# Patient Record
Sex: FEMALE | Race: WHITE | NOT HISPANIC OR LATINO | Employment: FULL TIME | URBAN - METROPOLITAN AREA
[De-identification: names, ages, dates, MRNs, and addresses within clinical notes are randomized per-mention and may not be internally consistent; named-entity substitution may affect disease eponyms.]

---

## 2021-07-12 ENCOUNTER — TRANSFERRED RECORDS (OUTPATIENT)
Dept: MULTI SPECIALTY CLINIC | Facility: CLINIC | Age: 36
End: 2021-07-12

## 2021-07-12 LAB
HPV ABSTRACT: NORMAL
PAP-ABSTRACT: NORMAL

## 2023-04-14 ENCOUNTER — OFFICE VISIT (OUTPATIENT)
Dept: FAMILY MEDICINE | Facility: CLINIC | Age: 38
End: 2023-04-14
Payer: COMMERCIAL

## 2023-04-14 VITALS
RESPIRATION RATE: 20 BRPM | WEIGHT: 233 LBS | HEART RATE: 68 BPM | TEMPERATURE: 99.2 F | BODY MASS INDEX: 37.45 KG/M2 | SYSTOLIC BLOOD PRESSURE: 118 MMHG | OXYGEN SATURATION: 99 % | HEIGHT: 66 IN | DIASTOLIC BLOOD PRESSURE: 76 MMHG

## 2023-04-14 DIAGNOSIS — Z00.00 ROUTINE GENERAL MEDICAL EXAMINATION AT A HEALTH CARE FACILITY: Primary | ICD-10-CM

## 2023-04-14 DIAGNOSIS — Z13.220 LIPID SCREENING: ICD-10-CM

## 2023-04-14 DIAGNOSIS — Z11.4 SCREENING FOR HIV (HUMAN IMMUNODEFICIENCY VIRUS): ICD-10-CM

## 2023-04-14 DIAGNOSIS — Z11.59 SCREENING FOR VIRAL DISEASE: ICD-10-CM

## 2023-04-14 DIAGNOSIS — K59.00 CONSTIPATION, UNSPECIFIED CONSTIPATION TYPE: ICD-10-CM

## 2023-04-14 DIAGNOSIS — Z11.59 NEED FOR HEPATITIS C SCREENING TEST: ICD-10-CM

## 2023-04-14 DIAGNOSIS — Z12.4 CERVICAL CANCER SCREENING: ICD-10-CM

## 2023-04-14 DIAGNOSIS — F33.1 MODERATE EPISODE OF RECURRENT MAJOR DEPRESSIVE DISORDER (H): ICD-10-CM

## 2023-04-14 DIAGNOSIS — R73.03 PREDIABETES: ICD-10-CM

## 2023-04-14 DIAGNOSIS — F41.1 GENERALIZED ANXIETY DISORDER: ICD-10-CM

## 2023-04-14 PROBLEM — F33.9 MAJOR DEPRESSION, RECURRENT (H): Status: ACTIVE | Noted: 2023-04-14

## 2023-04-14 PROBLEM — F60.3 BORDERLINE PERSONALITY DISORDER (H): Status: RESOLVED | Noted: 2023-04-14 | Resolved: 2023-04-14

## 2023-04-14 PROBLEM — R73.09 ELEVATED HEMOGLOBIN A1C: Status: RESOLVED | Noted: 2021-07-13 | Resolved: 2023-04-14

## 2023-04-14 PROBLEM — F43.10 PTSD (POST-TRAUMATIC STRESS DISORDER): Status: ACTIVE | Noted: 2017-02-03

## 2023-04-14 PROBLEM — F60.3 BORDERLINE PERSONALITY DISORDER (H): Status: ACTIVE | Noted: 2023-04-14

## 2023-04-14 PROBLEM — R73.09 ELEVATED HEMOGLOBIN A1C: Status: ACTIVE | Noted: 2021-07-13

## 2023-04-14 LAB
ANION GAP SERPL CALCULATED.3IONS-SCNC: 15 MMOL/L (ref 7–15)
BUN SERPL-MCNC: 9.6 MG/DL (ref 6–20)
CALCIUM SERPL-MCNC: 10.3 MG/DL (ref 8.6–10)
CHLORIDE SERPL-SCNC: 104 MMOL/L (ref 98–107)
CHOLEST SERPL-MCNC: 124 MG/DL
CREAT SERPL-MCNC: 0.72 MG/DL (ref 0.51–0.95)
DEPRECATED HCO3 PLAS-SCNC: 22 MMOL/L (ref 22–29)
GFR SERPL CREATININE-BSD FRML MDRD: >90 ML/MIN/1.73M2
GLUCOSE SERPL-MCNC: 111 MG/DL (ref 70–99)
HBA1C MFR BLD: 6.1 % (ref 0–5.6)
HDLC SERPL-MCNC: 57 MG/DL
LDLC SERPL CALC-MCNC: 49 MG/DL
NONHDLC SERPL-MCNC: 67 MG/DL
POTASSIUM SERPL-SCNC: 4.4 MMOL/L (ref 3.4–5.3)
SODIUM SERPL-SCNC: 141 MMOL/L (ref 136–145)
TRIGL SERPL-MCNC: 92 MG/DL
TSH SERPL DL<=0.005 MIU/L-ACNC: 1.75 UIU/ML (ref 0.3–4.2)

## 2023-04-14 PROCEDURE — 91312 COVID-19 VACCINE BIVALENT BOOSTER 12+ (PFIZER): CPT | Performed by: FAMILY MEDICINE

## 2023-04-14 PROCEDURE — 87389 HIV-1 AG W/HIV-1&-2 AB AG IA: CPT | Performed by: FAMILY MEDICINE

## 2023-04-14 PROCEDURE — 80048 BASIC METABOLIC PNL TOTAL CA: CPT | Performed by: FAMILY MEDICINE

## 2023-04-14 PROCEDURE — 83036 HEMOGLOBIN GLYCOSYLATED A1C: CPT | Performed by: FAMILY MEDICINE

## 2023-04-14 PROCEDURE — 84443 ASSAY THYROID STIM HORMONE: CPT | Performed by: FAMILY MEDICINE

## 2023-04-14 PROCEDURE — 86803 HEPATITIS C AB TEST: CPT | Performed by: FAMILY MEDICINE

## 2023-04-14 PROCEDURE — 86706 HEP B SURFACE ANTIBODY: CPT | Performed by: FAMILY MEDICINE

## 2023-04-14 PROCEDURE — 36415 COLL VENOUS BLD VENIPUNCTURE: CPT | Performed by: FAMILY MEDICINE

## 2023-04-14 PROCEDURE — 0124A COVID-19 VACCINE BIVALENT BOOSTER 12+ (PFIZER): CPT | Performed by: FAMILY MEDICINE

## 2023-04-14 PROCEDURE — 99385 PREV VISIT NEW AGE 18-39: CPT | Mod: 25 | Performed by: FAMILY MEDICINE

## 2023-04-14 PROCEDURE — 80061 LIPID PANEL: CPT | Performed by: FAMILY MEDICINE

## 2023-04-14 PROCEDURE — 90471 IMMUNIZATION ADMIN: CPT | Performed by: FAMILY MEDICINE

## 2023-04-14 PROCEDURE — 90715 TDAP VACCINE 7 YRS/> IM: CPT | Performed by: FAMILY MEDICINE

## 2023-04-14 PROCEDURE — 99214 OFFICE O/P EST MOD 30 MIN: CPT | Mod: 25 | Performed by: FAMILY MEDICINE

## 2023-04-14 RX ORDER — POLYETHYLENE GLYCOL 3350 17 G/17G
1 POWDER, FOR SOLUTION ORAL DAILY
Qty: 116 G | Refills: 1 | Status: SHIPPED | OUTPATIENT
Start: 2023-04-14 | End: 2023-09-14

## 2023-04-14 RX ORDER — BUSPIRONE HYDROCHLORIDE 5 MG/1
TABLET ORAL
Qty: 264 TABLET | Refills: 0 | Status: SHIPPED | OUTPATIENT
Start: 2023-04-14 | End: 2023-07-18

## 2023-04-14 ASSESSMENT — ANXIETY QUESTIONNAIRES
8. IF YOU CHECKED OFF ANY PROBLEMS, HOW DIFFICULT HAVE THESE MADE IT FOR YOU TO DO YOUR WORK, TAKE CARE OF THINGS AT HOME, OR GET ALONG WITH OTHER PEOPLE?: SOMEWHAT DIFFICULT
5. BEING SO RESTLESS THAT IT IS HARD TO SIT STILL: SEVERAL DAYS
3. WORRYING TOO MUCH ABOUT DIFFERENT THINGS: MORE THAN HALF THE DAYS
GAD7 TOTAL SCORE: 10
2. NOT BEING ABLE TO STOP OR CONTROL WORRYING: MORE THAN HALF THE DAYS
7. FEELING AFRAID AS IF SOMETHING AWFUL MIGHT HAPPEN: SEVERAL DAYS
4. TROUBLE RELAXING: SEVERAL DAYS
GAD7 TOTAL SCORE: 10
7. FEELING AFRAID AS IF SOMETHING AWFUL MIGHT HAPPEN: SEVERAL DAYS
6. BECOMING EASILY ANNOYED OR IRRITABLE: SEVERAL DAYS
GAD7 TOTAL SCORE: 10
IF YOU CHECKED OFF ANY PROBLEMS ON THIS QUESTIONNAIRE, HOW DIFFICULT HAVE THESE PROBLEMS MADE IT FOR YOU TO DO YOUR WORK, TAKE CARE OF THINGS AT HOME, OR GET ALONG WITH OTHER PEOPLE: SOMEWHAT DIFFICULT
1. FEELING NERVOUS, ANXIOUS, OR ON EDGE: MORE THAN HALF THE DAYS

## 2023-04-14 ASSESSMENT — PATIENT HEALTH QUESTIONNAIRE - PHQ9
SUM OF ALL RESPONSES TO PHQ QUESTIONS 1-9: 9
SUM OF ALL RESPONSES TO PHQ QUESTIONS 1-9: 9
10. IF YOU CHECKED OFF ANY PROBLEMS, HOW DIFFICULT HAVE THESE PROBLEMS MADE IT FOR YOU TO DO YOUR WORK, TAKE CARE OF THINGS AT HOME, OR GET ALONG WITH OTHER PEOPLE: SOMEWHAT DIFFICULT

## 2023-04-14 ASSESSMENT — PAIN SCALES - GENERAL: PAINLEVEL: NO PAIN (0)

## 2023-04-14 NOTE — LETTER
April 17, 2023      Malou Gomes  [address] [facility] [address]        Dear ,    The results of your recent lab tests were within normal limits. Enclosed is a copy of these results.  If you have any further questions or problems, please contact our office.      Sincerely,      Blanquita Schwab, DO      Resulted Orders   Lipid panel reflex to direct LDL Non-fasting   Result Value Ref Range    Cholesterol 124 <200 mg/dL    Triglycerides 92 <150 mg/dL    Direct Measure HDL 57 >=50 mg/dL    LDL Cholesterol Calculated 49 <=100 mg/dL    Non HDL Cholesterol 67 <130 mg/dL    Narrative    Cholesterol  Desirable:  <200 mg/dL    Triglycerides  Normal:  Less than 150 mg/dL  Borderline High:  150-199 mg/dL  High:  200-499 mg/dL  Very High:  Greater than or equal to 500 mg/dL    Direct Measure HDL  Female:  Greater than or equal to 50 mg/dL   Male:  Greater than or equal to 40 mg/dL    LDL Cholesterol  Desirable:  <100mg/dL  Above Desirable:  100-129 mg/dL   Borderline High:  130-159 mg/dL   High:  160-189 mg/dL   Very High:  >= 190 mg/dL    Non HDL Cholesterol  Desirable:  130 mg/dL  Above Desirable:  130-159 mg/dL  Borderline High:  160-189 mg/dL  High:  190-219 mg/dL  Very High:  Greater than or equal to 220 mg/dL   TSH with free T4 reflex   Result Value Ref Range    TSH 1.75 0.30 - 4.20 uIU/mL   Hemoglobin A1c   Result Value Ref Range    Hemoglobin A1C 6.1 (H) 0.0 - 5.6 %      Comment:      Normal <5.7%   Prediabetes 5.7-6.4%    Diabetes 6.5% or higher     Note: Adopted from ADA consensus guidelines.   Basic metabolic panel  (Ca, Cl, CO2, Creat, Gluc, K, Na, BUN)   Result Value Ref Range    Sodium 141 136 - 145 mmol/L    Potassium 4.4 3.4 - 5.3 mmol/L    Chloride 104 98 - 107 mmol/L    Carbon Dioxide (CO2) 22 22 - 29 mmol/L    Anion Gap 15 7 - 15 mmol/L    Urea Nitrogen 9.6 6.0 - 20.0 mg/dL    Creatinine 0.72 0.51 - 0.95 mg/dL    Calcium 10.3 (H) 8.6 - 10.0 mg/dL    Glucose 111 (H) 70 - 99 mg/dL    GFR Estimate  >90 >60 mL/min/1.73m2      Comment:      eGFR calculated using 2021 CKD-EPI equation.

## 2023-04-14 NOTE — PROGRESS NOTES
SUBJECTIVE:   CC: Malou is an 37 year old who presents for preventive health visit.       4/14/2023     7:13 AM   Additional Questions   Roomed by Sasha   Accompanied by self         4/14/2023     7:13 AM   Patient Reported Additional Medications   Patient reports taking the following new medications Paxil 40mg, Lorazepam 1mg PRN     Patient has been advised of split billing requirements and indicates understanding: Yes     History of Present Illness       Mental Health Follow-up:  Patient presents to follow-up on Depression & Anxiety.Patient's depression since last visit has been:  No change  The patient is not having other symptoms associated with depression.  Patient's anxiety since last visit has been:  No change  The patient is having other symptoms associated with anxiety.  Any significant life events: relationship concerns and financial concerns  Patient is feeling anxious or having panic attacks.  Patient has no concerns about alcohol or drug use.    Reason for visit:  New patient. have concerns about prescriptions, prediabetes and constipation  Symptom onset:  More than a month  Symptoms include:  Prediabetes via bloodwork. lack of bowel movements  Symptom intensity:  Moderate  Symptom progression:  Staying the same  Had these symptoms before:  Yes  Has tried/received treatment for these symptoms:  No    She eats 2-3 servings of fruits and vegetables daily.She consumes 0 sweetened beverage(s) daily.She exercises with enough effort to increase her heart rate 20 to 29 minutes per day.  She exercises with enough effort to increase her heart rate 5 days per week.   She is taking medications regularly.  She is not taking prescribed medications regularly due to None.    Today's PHQ-9         PHQ-9 Total Score: 9    PHQ-9 Q9 Thoughts of better off dead/self-harm past 2 weeks :   Not at all    How difficult have these problems made it for you to do your work, take care of things at home, or get along with other  people: Somewhat difficult  Today's ADELINA-7 Score: 10  Healthy Habits:     Getting at least 3 servings of Calcium per day:  Yes    Bi-annual eye exam:  Yes    Dental care twice a year:  Yes    Sleep apnea or symptoms of sleep apnea:  None    Diet:  Regular (no restrictions)    Frequency of exercise:  4-5 days/week    Duration of exercise:  15-30 minutes    Taking medications regularly:  0    Barriers to taking medications:  None    Medication side effects:  None    PHQ-2 Total Score: 2    Additional concerns today:  Yes    --- Establish care, follow up on moods  ---Constipation issue the past few weeks.  She has only 2-3 BM's weekly.  They are hard.  She is drinking a lot of water and eating fiber.  She has had prunes.    ---Recheck labs, prediabetes based on last labs    She is on paxil 40 mg for 5-6 years.  She has depression and anxiety.  She was not abused a child.  She takes ativan for panic attacks.  It seems to help her a lot.  She takes about 1/2 tab a week.   She was on xanax.  She was on other antidepressants including lexapro, prozac.  She still is very anxious.  Tara work is okay.  She is  to a female partner.  She has some friends.  She is doing softball.  She doesn't have kids or plan kids.        Please abstract the following data from this visit with this patient into the appropriate field in Epic:    Other Tests found in the patient's chart through Chart Review/Care Everywhere:    Pap smear done by this group [facility] on this date: 7/12/2021    Note to Abstraction: If this section is blank, no results were found via Chart Review/Care Everywhere.          Today's PHQ-2 Score:       4/14/2023     7:00 AM   PHQ-2 ( 1999 Pf[employer])   PHQ-2 Score Incomplete       Have you ever done Advance Care Planning? (For example, a Health Directive, POLST, or a discussion with a medical provider or your loved ones about your wishes): No, advance care planning information given to patient to review.  Patient  plans to discuss their wishes with loved ones or provider.      Social History     Tobacco Use     Smoking status: Never     Passive exposure: Never     Smokeless tobacco: Never   Vaping Use     Vaping status: Never Used   Substance Use Topics     Alcohol use: Not on file              View : No data to display.                   View : No data to display.              Reviewed orders with patient.  Reviewed health maintenance and updated orders accordingly - Yes  BP Readings from Last 3 Encounters:   04/14/23 118/76    Wt Readings from Last 3 Encounters:   04/14/23 105.7 kg (233 lb)                    Breast Cancer Screening:    FHS-7:       4/14/2023     7:20 AM   Breast CA Risk Assessment (FHS-7)   Did any of your first-degree relatives have breast or ovarian cancer? No   Did any of your relatives have bilateral breast cancer? No   Did any man in your family have breast cancer? No   Did any woman in your family have breast and ovarian cancer? No   Did any woman in your family have breast cancer before age 50 y? No   Do you have 2 or more relatives with breast and/or ovarian cancer? No   Do you have 2 or more relatives with breast and/or bowel cancer? No     click delete button to remove this line now  Patient under 40 years of age: Routine Mammogram Screening not recommended.   Pertinent mammograms are reviewed under the imaging tab.    History of abnormal Pap smear: NO - age 30-65 PAP every 5 years with negative HPV co-testing recommended     Reviewed and updated as needed this visit by clinical staff   Tobacco  Allergies  Meds              Reviewed and updated as needed this visit by Provider                     Review of Systems  CONSTITUTIONAL: NEGATIVE for fever, chills, change in weight  INTEGUMENTARU/SKIN: NEGATIVE for worrisome rashes, moles or lesions  EYES: NEGATIVE for vision changes or irritation  ENT: NEGATIVE for ear, mouth and throat problems  RESP: NEGATIVE for significant cough or SOB  BREAST:  "NEGATIVE for masses, tenderness or discharge  CV: NEGATIVE for chest pain, palpitations or peripheral edema  GI: NEGATIVE for nausea, abdominal pain, heartburn, or change in bowel habits  : NEGATIVE for unusual urinary or vaginal symptoms. Periods are regular.  MUSCULOSKELETAL: NEGATIVE for significant arthralgias or myalgia  NEURO: NEGATIVE for weakness, dizziness or paresthesias  PSYCHIATRIC: NEGATIVE for changes in mood or affect     OBJECTIVE:   /76 (BP Location: Right arm, Patient Position: Sitting, Cuff Size: Adult Large)   Pulse 68   Temp 99.2  F (37.3  C) (Oral)   Resp 20   Ht 1.676 m (5' 6\")   Wt 105.7 kg (233 lb)   LMP 03/24/2023   SpO2 99%   BMI 37.61 kg/m    Physical Exam  GENERAL: healthy, alert and no distress  EYES: Eyes grossly normal to inspection, PERRL and conjunctivae and sclerae normal  HENT: ear canals and TM's normal, nose and mouth without ulcers or lesions  NECK: no adenopathy, no asymmetry, masses, or scars and thyroid normal to palpation  RESP: lungs clear to auscultation - no rales, rhonchi or wheezes  BREAST: normal without masses, tenderness or nipple discharge and no palpable axillary masses or adenopathy  CV: regular rate and rhythm, normal S1 S2, no S3 or S4, no murmur, click or rub, no peripheral edema and peripheral pulses strong  ABDOMEN: soft, nontender, no hepatosplenomegaly, no masses and bowel sounds normal  MS: no gross musculoskeletal defects noted, no edema  SKIN: no suspicious lesions or rashes  NEURO: Normal strength and tone, mentation intact and speech normal  PSYCH: mentation appears normal, affect normal/bright        ASSESSMENT/PLAN:   (Z00.00) Routine general medical examination at a health care facility  (primary encounter diagnosis)  Comment:   Plan:     (R73.03) Prediabetes  Comment: Monitor with labs today.  I may refer her to diabetic educator if her A1c is elevated  Plan: Basic metabolic panel  (Ca, Cl, CO2, Creat,         Gluc, K, Na, BUN), " Hemoglobin A1c            (F41.1) Generalized anxiety disorder  Comment: We will add BuSpar to her Paxil and increase the dose.  Recheck in 6 to 8 weeks  Plan: busPIRone (BUSPAR) 5 MG tablet            (F33.1) Moderate episode of recurrent major depressive disorder (H)  Comment: Continue Paxil 40 mg daily  Plan:     (K59.00) Constipation, unspecified constipation type  Comment:   Plan: TSH with free T4 reflex, polyethylene glycol         (MIRALAX) 17 GM/Dose powder        Trial MiraLAX and check TSH recheck in a few weeks    (Z13.220) Lipid screening  Comment:   Plan: Lipid panel reflex to direct LDL Non-fasting            (Z11.59) Screening for viral disease  Comment:   Plan: Hepatitis B Surface Antibody            (Z12.4) Cervical cancer screening  Comment:   Plan: Up-to-date    (Z11.4) Screening for HIV (human immunodeficiency virus)  Comment:   Plan: HIV Antigen Antibody Combo            (Z11.59) Need for hepatitis C screening test  Comment:   Plan: Hepatitis C Screen Reflex to HCV RNA Quant and         Genotype                  COUNSELING:  Reviewed preventive health counseling, as reflected in patient instructions       Regular exercise       Healthy diet/nutrition       Contraception       Family planning       Safe sex practices/STD prevention        She reports that she has never smoked. She has never been exposed to tobacco smoke. She has never used smokeless tobacco.      Blanquita Schwab DO  [facility]

## 2023-04-14 NOTE — Clinical Note
Please abstract the following data from this visit with this patient into the appropriate field in Epic:  Other Tests found in the patient's chart through Chart Review/Care Everywhere:  Pap smear done by [facility] on this date: 7/12/2021  Note to Abstraction: If this section is blank, no results were found via Chart Review/Care Everywhere.

## 2023-04-17 ENCOUNTER — MYC MEDICAL ADVICE (OUTPATIENT)
Dept: FAMILY MEDICINE | Facility: CLINIC | Age: 38
End: 2023-04-17
Payer: COMMERCIAL

## 2023-04-17 DIAGNOSIS — R73.03 PREDIABETES: Primary | ICD-10-CM

## 2023-04-17 LAB
HBV SURFACE AB SERPL IA-ACNC: 0.23 M[IU]/ML
HBV SURFACE AB SERPL IA-ACNC: NONREACTIVE M[IU]/ML
HCV AB SERPL QL IA: NONREACTIVE
HIV 1+2 AB+HIV1 P24 AG SERPL QL IA: NONREACTIVE

## 2023-04-22 ENCOUNTER — OFFICE VISIT (OUTPATIENT)
Dept: URGENT CARE | Facility: URGENT CARE | Age: 38
End: 2023-04-22
Payer: COMMERCIAL

## 2023-04-22 VITALS
BODY MASS INDEX: 37.28 KG/M2 | TEMPERATURE: 97.5 F | SYSTOLIC BLOOD PRESSURE: 117 MMHG | OXYGEN SATURATION: 98 % | WEIGHT: 232 LBS | DIASTOLIC BLOOD PRESSURE: 76 MMHG | HEART RATE: 57 BPM | HEIGHT: 66 IN

## 2023-04-22 DIAGNOSIS — H60.391 INFECTIVE OTITIS EXTERNA, RIGHT: Primary | ICD-10-CM

## 2023-04-22 PROCEDURE — 99203 OFFICE O/P NEW LOW 30 MIN: CPT | Performed by: INTERNAL MEDICINE

## 2023-04-22 RX ORDER — PAROXETINE 40 MG/1
40 TABLET, FILM COATED ORAL DAILY
COMMUNITY
Start: 2022-10-31 | End: 2023-09-14

## 2023-04-22 RX ORDER — NEOMYCIN SULFATE, POLYMYXIN B SULFATE AND HYDROCORTISONE 10; 3.5; 1 MG/ML; MG/ML; [USP'U]/ML
3 SUSPENSION/ DROPS AURICULAR (OTIC) 4 TIMES DAILY
Qty: 10 ML | Refills: 0 | Status: SHIPPED | OUTPATIENT
Start: 2023-04-22 | End: 2023-09-14

## 2023-04-22 RX ORDER — LORAZEPAM 1 MG/1
TABLET ORAL
COMMUNITY
Start: 2022-10-31

## 2023-05-20 ENCOUNTER — OFFICE VISIT (OUTPATIENT)
Dept: URGENT CARE | Facility: URGENT CARE | Age: 38
End: 2023-05-20
Payer: COMMERCIAL

## 2023-05-20 VITALS
WEIGHT: 250 LBS | BODY MASS INDEX: 40.18 KG/M2 | TEMPERATURE: 97.8 F | HEART RATE: 63 BPM | OXYGEN SATURATION: 98 % | SYSTOLIC BLOOD PRESSURE: 117 MMHG | HEIGHT: 66 IN | DIASTOLIC BLOOD PRESSURE: 72 MMHG

## 2023-05-20 DIAGNOSIS — H60.331 ACUTE SWIMMER'S EAR OF RIGHT SIDE: Primary | ICD-10-CM

## 2023-05-20 PROCEDURE — 99213 OFFICE O/P EST LOW 20 MIN: CPT | Performed by: FAMILY MEDICINE

## 2023-05-20 RX ORDER — CIPROFLOXACIN AND DEXAMETHASONE 3; 1 MG/ML; MG/ML
4 SUSPENSION/ DROPS AURICULAR (OTIC) 2 TIMES DAILY
Qty: 7.5 ML | Refills: 0 | Status: SHIPPED | OUTPATIENT
Start: 2023-05-20 | End: 2023-09-14

## 2023-05-20 NOTE — PROGRESS NOTES
Subjective: See notes from a month ago.  She said the left ear seemed to be involved back then 2 and the left ear has gotten better but the right ear is still quite painful.  She does not get water in the ears.  He had something like this in 2020 and first they gave her Cortisporin which did not work and then they switched to Ciprodex.  Hearing is muffled on that side.  It is quite painful.    Objective: Left TM and canal look pretty normal.  The right ear is swollen and red and quite tender.  It really hurts to move the pinna or the tragus.  No adenopathy.    Assessment and plan: Right otitis externa not responsive to Cortisporin.  We will switch to Ciprodex which worked last time.  Hopefully it is covered by her insurance.

## 2023-05-31 ENCOUNTER — ALLIED HEALTH/NURSE VISIT (OUTPATIENT)
Dept: NUTRITION | Facility: CLINIC | Age: 38
End: 2023-05-31
Attending: FAMILY MEDICINE
Payer: COMMERCIAL

## 2023-05-31 VITALS — WEIGHT: 237.4 LBS | BODY MASS INDEX: 38.32 KG/M2

## 2023-05-31 DIAGNOSIS — R73.03 PREDIABETES: ICD-10-CM

## 2023-05-31 PROCEDURE — 97802 MEDICAL NUTRITION INDIV IN: CPT

## 2023-05-31 NOTE — PATIENT INSTRUCTIONS
Look at labels for added sugars. Trying to keep this less than 25 gm/day.    2. Total carbs: 30-45 gm/meal and 15-30 gm for snacks (range  gm/day)    3. Add another vegetable to dinner.     4. Watch added oils and dressings.  Ideal to limit to 1-2 Tbsp at most per serving.     5. Follow up with Dr. Schwab about the hormones (Prolactin high and testosterone low) and possibly trying Semaglutide (ozempic) and see if covered by insurance or metformin.    Check out [employer]orieKing.com if you need to look up any food    FOLLOW UP APPOINTMENT: In person follow up on Wednesday, July 12th at 3:30pm    Andria Figueroa RDN, LD, [facility]   [phone]

## 2023-05-31 NOTE — PROGRESS NOTES
Medical Nutrition Therapy  Visit Type:Initial assessment and intervention    Malou Gomes presents today for MNT and education related to prediabetes.   She is accompanied by self.     ASSESSMENT:   Patient comments/concerns relating to nutrition: Says grandma had diabetes before she passed so does not know much about prediabetes. No other family history.  Was curious about semaglutide.     Says not sure why weight has gone up- eats fruits and vegetables and only eats out once every 2 weeks.     NUTRITION HISTORY:    Breakfast: 6:30amnone on weekend OR Special K cereal (1 cup) with skim/1% OR yogurt (Greek) with granola (sprinkle) and water/Sparkling water/Coffee 2-3x/week -no cream with sugar 1/2 the time.  AM: 1/2 cup blueberries  Lunch: 11:30am: Luther salad with grilled chicken, diet coke, 3 cup water OR G[employer] regular sandwich and diet coke OR 1/2-1 cup fruit, 1/2 burrito (carne asada), 3 cups water OR KIND granola bar thin OR sandwich and fruit OR pasta/leftovers and fruits  Dinner: 5-6pm: hot dog on bun, baby carrots, 3 beers, tortillas (1 handful) and guacamole (pre pkg) OR 1/2 quesadilla, run and diet coke, small DQ blizzard OR 1/2 burrito and diet coke OR small salad with balsamic chicken, dried cranberries, one slice frozen pizza OR pasta with veggies (broccoli/asparagus).   Snacks: none OR [employer] Popcorn  Beverages: Alcohol 8 drinks a week,     Misses meals? none  Eats out:  2 meals/per month     Previous diet education:  No     Food allergies/intolerances: none  Dislikes: pickled foods and peas     Diet is high in: sodium at some sittings  Diet is low in: fiber, fruits and vegetables    EXERCISE: Says desk orientated but has a standing desk. Plays softball on Sundays, basketball on occasion, walks dog 30 minutes a day, pilates type machine (pull ups).    SOCIO/ECONOMIC:   Lives with: wife    MEDICATIONS:  Current Outpatient Medications   Medication     busPIRone (BUSPAR) 5 MG tablet      "ciprofloxacin-dexamethasone (CIPRODEX) 0.3-0.1 % otic suspension     LORazepam (ATIVAN) 1 MG tablet     neomycin-polymyxin-hydrocortisone (CORTISPORIN) 3.5-42899-7 otic suspension     PARoxetine (PAXIL) 40 MG tablet     polyethylene glycol (MIRALAX) 17 GM/Dose powder     No current facility-administered medications for this visit.       LABS:  Lab Results   Component Value Date     04/14/2023      Lab Results   Component Value Date    POTASSIUM 4.4 04/14/2023     Lab Results   Component Value Date    CHLORIDE 104 04/14/2023     Lab Results   Component Value Date    SAMUEL 10.3 04/14/2023     Lab Results   Component Value Date    CO2 22 04/14/2023     Lab Results   Component Value Date    BUN 9.6 04/14/2023     Lab Results   Component Value Date    CR 0.72 04/14/2023     Lab Results   Component Value Date     04/14/2023     Lab Results   Component Value Date    LDL 49 04/14/2023     Direct Measure HDL   Date Value Ref Range Status   04/14/2023 57 >=50 mg/dL Final   ]  GFR Estimate   Date Value Ref Range Status   04/14/2023 >90 >60 mL/min/1.73m2 Final     Comment:     eGFR calculated using 2021 CKD-EPI equation.     Lab Results   Component Value Date    CR 0.72 04/14/2023     No results found for: MICROALBUMIN    ANTHROPOMETRICS:  Vitals: [facility] 05/05/2023   Estimated body mass index is 38.32 kg/m  as calculated from the following:    Height as of 5/20/23: 1.676 m (5' 6\").    Weight as of this encounter: 107.7 kg (237 lb 6.4 oz).       Wt Readings from Last 5 Encounters:   05/20/23 113.4 kg (250 lb)   04/22/23 105.2 kg (232 lb)   04/14/23 105.7 kg (233 lb)       Weight Change: lost 12.6 lbs in the past 11 days    ESTIMATED KCAL REQUIREMENTS:  2146 kcal per day    NUTRITION DIAGNOSIS: Food- and nutrition-related knowledge deficit related to prediabetes as evidenced by patient stating she is not sure what is going on or what changes to make to prevent diabetes    NUTRITION INTERVENTION:  Nutrition Prescription: " Energy Intake: 2383-5006 kcal/day for weight loss  Carbohydrate Intake: 30-45 grams/meal and 15-30 grams/snacks  Added sugar: <25 gm/day  Education given to support: general nutrition guidelines, weight reduction, consistent meals, labeling, ETOH, exercise, fiber, behavior modification, portion control and heart healthy diet  Education Materials Provided: 1500 & 1800 Calorie 5-day Sample Menus, 100 Calorie Snacks, Weight Loss Tips and Cooking Tips for Weight Management  Motivational Interviewing    Discussion: Discussed the physiology of pre-diabetes, risk factors and diagnostic criteria.  Discussed ways to help with losing 5-10% of current body weight, which can reduce insulin resistance. Suggested plate method to help balance meals by making 1/2 plate vegetables at dinner so easier to cut back on portions of grains or meat at the meal. Reviewed label reading and also provided information on ways to limit added sugars and actual carbohydrate intake. Commended on her activity to walk the dog daily, and she made recent change of increasing the intensity of her exercise program with playing softball and basketball. Suggested including weights/resistance a couple times a week if able, since muscle building may improve insulin sensitivity.    Provided some ideas on healthy breakfast ideas by trying to pair carbohydrate and protein or eat higher fiber/whole grain options. Since Malou not feel like eating poorly or a large portion, encouraged her to check on added fat while cooking or dressing portions.  Cutting back on beer was also discussed but not a focus today as this is something Malou enjoys and wants to try adjusting later if not seeing weight loss from other changes. Also suggested trying to track calories for a couple of days if struggling to help determine if over or under consuming energy needs and places for change. Pt verbalized understanding of concepts discussed and recommendations provided.          PATIENT'S BEHAVIOR CHANGE GOALS:   See Patient Instructions for patient stated behavior change goals. AVS was printed and given to patient at today's appointment.    MONITOR / EVALUATE:  RD will monitor/evaluate:  Blood Glucose / A1c  Food / Beverage / Nutrient intake   Pertinent Labs  Progress toward meeting stated nutrition-related goals  Weight change    FOLLOW-UP:  Call RD with questions/concerns.   Follow-up appointment scheduled on 7/12/23.     Andria Figueroa RDN, LD, [facility]   Time spent in minutes: 75 minutes  Encounter: Individual

## 2023-06-09 ENCOUNTER — OFFICE VISIT (OUTPATIENT)
Dept: FAMILY MEDICINE | Facility: CLINIC | Age: 38
End: 2023-06-09
Payer: COMMERCIAL

## 2023-06-09 ENCOUNTER — MYC MEDICAL ADVICE (OUTPATIENT)
Dept: FAMILY MEDICINE | Facility: CLINIC | Age: 38
End: 2023-06-09

## 2023-06-09 ENCOUNTER — TELEPHONE (OUTPATIENT)
Dept: FAMILY MEDICINE | Facility: CLINIC | Age: 38
End: 2023-06-09

## 2023-06-09 VITALS
HEART RATE: 65 BPM | WEIGHT: 237 LBS | DIASTOLIC BLOOD PRESSURE: 70 MMHG | SYSTOLIC BLOOD PRESSURE: 114 MMHG | RESPIRATION RATE: 16 BRPM | OXYGEN SATURATION: 98 % | BODY MASS INDEX: 38.09 KG/M2 | TEMPERATURE: 98.3 F | HEIGHT: 66 IN

## 2023-06-09 DIAGNOSIS — H60.91 RECURRENT OTITIS EXTERNA OF RIGHT EAR: ICD-10-CM

## 2023-06-09 DIAGNOSIS — R73.03 PREDIABETES: Primary | ICD-10-CM

## 2023-06-09 DIAGNOSIS — R73.03 PREDIABETES: ICD-10-CM

## 2023-06-09 DIAGNOSIS — E66.01 MORBID OBESITY (H): ICD-10-CM

## 2023-06-09 DIAGNOSIS — F43.10 PTSD (POST-TRAUMATIC STRESS DISORDER): ICD-10-CM

## 2023-06-09 DIAGNOSIS — E66.01 MORBID OBESITY (H): Primary | ICD-10-CM

## 2023-06-09 DIAGNOSIS — F33.1 MODERATE EPISODE OF RECURRENT MAJOR DEPRESSIVE DISORDER (H): ICD-10-CM

## 2023-06-09 PROCEDURE — 96127 BRIEF EMOTIONAL/BEHAV ASSMT: CPT | Performed by: FAMILY MEDICINE

## 2023-06-09 PROCEDURE — 90746 HEPB VACCINE 3 DOSE ADULT IM: CPT | Performed by: FAMILY MEDICINE

## 2023-06-09 PROCEDURE — 90471 IMMUNIZATION ADMIN: CPT | Performed by: FAMILY MEDICINE

## 2023-06-09 PROCEDURE — 99214 OFFICE O/P EST MOD 30 MIN: CPT | Mod: 25 | Performed by: FAMILY MEDICINE

## 2023-06-09 ASSESSMENT — ANXIETY QUESTIONNAIRES
GAD7 TOTAL SCORE: 5
IF YOU CHECKED OFF ANY PROBLEMS ON THIS QUESTIONNAIRE, HOW DIFFICULT HAVE THESE PROBLEMS MADE IT FOR YOU TO DO YOUR WORK, TAKE CARE OF THINGS AT HOME, OR GET ALONG WITH OTHER PEOPLE: SOMEWHAT DIFFICULT
6. BECOMING EASILY ANNOYED OR IRRITABLE: SEVERAL DAYS
GAD7 TOTAL SCORE: 5
2. NOT BEING ABLE TO STOP OR CONTROL WORRYING: SEVERAL DAYS
4. TROUBLE RELAXING: NOT AT ALL
7. FEELING AFRAID AS IF SOMETHING AWFUL MIGHT HAPPEN: SEVERAL DAYS
1. FEELING NERVOUS, ANXIOUS, OR ON EDGE: SEVERAL DAYS
GAD7 TOTAL SCORE: 5
8. IF YOU CHECKED OFF ANY PROBLEMS, HOW DIFFICULT HAVE THESE MADE IT FOR YOU TO DO YOUR WORK, TAKE CARE OF THINGS AT HOME, OR GET ALONG WITH OTHER PEOPLE?: SOMEWHAT DIFFICULT
5. BEING SO RESTLESS THAT IT IS HARD TO SIT STILL: NOT AT ALL
7. FEELING AFRAID AS IF SOMETHING AWFUL MIGHT HAPPEN: SEVERAL DAYS
3. WORRYING TOO MUCH ABOUT DIFFERENT THINGS: SEVERAL DAYS

## 2023-06-09 ASSESSMENT — PAIN SCALES - GENERAL: PAINLEVEL: NO PAIN (0)

## 2023-06-09 ASSESSMENT — PATIENT HEALTH QUESTIONNAIRE - PHQ9
10. IF YOU CHECKED OFF ANY PROBLEMS, HOW DIFFICULT HAVE THESE PROBLEMS MADE IT FOR YOU TO DO YOUR WORK, TAKE CARE OF THINGS AT HOME, OR GET ALONG WITH OTHER PEOPLE: SOMEWHAT DIFFICULT
SUM OF ALL RESPONSES TO PHQ QUESTIONS 1-9: 6
SUM OF ALL RESPONSES TO PHQ QUESTIONS 1-9: 6

## 2023-06-09 ASSESSMENT — ENCOUNTER SYMPTOMS: NERVOUS/ANXIOUS: 1

## 2023-06-09 NOTE — PROGRESS NOTES
"  Assessment & Plan     Prediabetes    Start Ozempic to help with weight and her prediabetic status.  We will recheck in 8 weeks    - semaglutide (OZEMPIC) 2 MG/3ML pen; Inject 0.25 mg Subcutaneous every 7 days for 30 days, THEN 0.5 mg every 7 days for 30 days.    PTSD (post-traumatic stress disorder)  Recommended finding a counselor and doing EMDR    Moderate episode of recurrent major depressive disorder (H)  The current medical regimen is effective;  continue present plan and medications.      Recurrent otitis externa of right ear  The patient's past medical episodes of otitis externa a year.  Recommend ENT consult  - Adult ENT  Referral; Future    Morbid obesity (H)  Trial semaglutide for weight management as well as prediabetic status  - semaglutide (OZEMPIC) 2 MG/3ML pen; Inject 0.25 mg Subcutaneous every 7 days for 30 days, THEN 0.5 mg every 7 days for 30 days.      30 minutes spent by me on the date of the encounter doing chart review, history and exam, documentation and further activities per the note       BMI:   Estimated body mass index is 38.25 kg/m  as calculated from the following:    Height as of this encounter: 1.676 m (5' 6\").    Weight as of this encounter: 107.5 kg (237 lb).   Weight management plan: Discussed healthy diet and exercise guidelines        Blanquita Schwab DO  [facility]    Joann Westfall is a 38 year old, presenting for the following health issues:  Depression and Anxiety        6/9/2023     6:52 AM   Additional Questions   Roomed by Sasha         6/9/2023     6:52 AM   Patient Reported Additional Medications   Patient reports taking the following new medications None     Anxiety    History of Present Illness       Mental Health Follow-up:  Patient presents to follow-up on Depression & Anxiety.Patient's depression since last visit has been:  Medium  The patient is not having other symptoms associated with depression.  Patient's anxiety since last " visit has been:  Better  The patient is having other symptoms associated with anxiety.  Any significant life events: relationship concerns, financial concerns and health concerns  Patient is feeling anxious or having panic attacks.  Patient has no concerns about alcohol or drug use.    She eats 2-3 servings of fruits and vegetables daily.She consumes 0 sweetened beverage(s) daily.She exercises with enough effort to increase her heart rate 20 to 29 minutes per day.  She exercises with enough effort to increase her heart rate 4 days per week.   She is taking medications regularly.    Today's PHQ-9         PHQ-9 Total Score: 6    PHQ-9 Q9 Thoughts of better off dead/self-harm past 2 weeks :   Not at all    How difficult have these problems made it for you to do your work, take care of things at home, or get along with other people: Somewhat difficult  Today's ADELINA-7 Score: 5     Patient was seen about 6 weeks ago for a routine physical.  She was a new patient at that time.  Her depression anxiety were not controlled so BuSpar was added to her Paxil 40 g daily.  She also been taking Ativan at times.  I recommended trying to avoid Ativan and just take BuSpar.  She is taking buspar 10 mg bid.      Patient has history of recurrent otitis externa.  She has never seen ENT for this.  She had episode of otitis externa last month.    Social History     Tobacco Use     Smoking status: Never     Passive exposure: Never     Smokeless tobacco: Never   Vaping Use     Vaping status: Never Used         4/14/2023     7:00 AM 6/9/2023     6:48 AM   PHQ   PHQ-9 Total Score 9 6   Q9: Thoughts of better off dead/self-harm past 2 weeks Not at all Not at all         4/14/2023     7:00 AM 6/9/2023     6:48 AM   ADELINA-7 SCORE   Total Score 10 (moderate anxiety) 5 (mild anxiety)   Total Score 10 5         6/9/2023     6:48 AM   Last PHQ-9   1.  Little interest or pleasure in doing things 1   2.  Feeling down, depressed, or hopeless 0   3.  Trouble  "falling or staying asleep, or sleeping too much 1   4.  Feeling tired or having little energy 2   5.  Poor appetite or overeating 0   6.  Feeling bad about yourself 0   7.  Trouble concentrating 1   8.  Moving slowly or restless 1   Q9: Thoughts of better off dead/self-harm past 2 weeks 0   PHQ-9 Total Score 6         6/9/2023     6:48 AM   ADELINA-7    1. Feeling nervous, anxious, or on edge 1   2. Not being able to stop or control worrying 1   3. Worrying too much about different things 1   4. Trouble relaxing 0   5. Being so restless that it is hard to sit still 0   6. Becoming easily annoyed or irritable 1   7. Feeling afraid, as if something awful might happen 1   ADELINA-7 Total Score 5   If you checked any problems, how difficult have they made it for you to do your work, take care of things at home, or get along with other people? Somewhat difficult       Suicide Assessment Five-step Evaluation and Treatment (SAFE-T)        Review of Systems   Psychiatric/Behavioral: The patient is nervous/anxious.       Constitutional, HEENT, cardiovascular, pulmonary, gi and gu systems are negative, except as otherwise noted.      Objective    /70 (BP Location: Right arm, Patient Position: Sitting, Cuff Size: Adult Large)   Pulse 65   Temp 98.3  F (36.8  C) (Oral)   Resp 16   Ht 1.676 m (5' 6\")   Wt 107.5 kg (237 lb)   LMP 05/05/2023   SpO2 98%   BMI 38.25 kg/m    Body mass index is 38.25 kg/m .  Physical Exam   GENERAL: healthy, alert and no distress  HENT: ear canals and TM's normal, nose and mouth without ulcers or lesions  NECK: no adenopathy, no asymmetry, masses, or scars and thyroid normal to palpation  RESP: lungs clear to auscultation - no rales, rhonchi or wheezes  CV: regular rate and rhythm, normal S1 S2, no S3 or S4, no murmur, click or rub, no peripheral edema and peripheral pulses strong  PSYCH: mentation appears normal, affect normal/bright    No results found for this or any previous visit (from the " past 24 hour(s)).

## 2023-06-09 NOTE — TELEPHONE ENCOUNTER
Routing My Chart message to PCP    Message regarding denial    Joao Andrews, RN, BSN, PHN  [facility]

## 2023-06-09 NOTE — NURSING NOTE
Prior to immunization administration, verified patients identity using patient s name and date of birth. Please see Immunization Activity for additional information.     Screening Questionnaire for Adult Immunization    Are you sick today?   No   Do you have allergies to medications, food, a vaccine component or latex?   No   Have you ever had a serious reaction after receiving a vaccination?   No   Do you have a long-term health problem with heart, lung, kidney, or metabolic disease (e.g., diabetes), asthma, a blood disorder, no spleen, complement component deficiency, a cochlear implant, or a spinal fluid leak?  Are you on long-term aspirin therapy?   No   Do you have cancer, leukemia, HIV/AIDS, or any other immune system problem?   No   Do you have a parent, brother, or sister with an immune system problem?   No   In the past 3 months, have you taken medications that affect  your immune system, such as prednisone, other steroids, or anticancer drugs; drugs for the treatment of rheumatoid arthritis, Crohn s disease, or psoriasis; or have you had radiation treatments?   No   Have you had a seizure, or a brain or other nervous system problem?   No   During the past year, have you received a transfusion of blood or blood    products, or been given immune (gamma) globulin or antiviral drug?   No   For women: Are you pregnant or is there a chance you could become       pregnant during the next month?   No   Have you received any vaccinations in the past 4 weeks?   No     Immunization questionnaire answers were all negative.      Injection of Hep B # 1 given by Sasha Smith CMA. Patient instructed to remain in clinic for 15 minutes afterwards, and to report any adverse reactions.     Screening performed by Sasha Smith CMA on 6/9/2023 at 7:34 AM.

## 2023-06-09 NOTE — TELEPHONE ENCOUNTER
PRIOR AUTHORIZATION DENIED    Medication: SEMAGLUTIDE (1 MG/DOSE) 2 MG/1.5ML SC SOPN DRUG NO LONGER IN [employer]  Insurance Company: [employer] ([facility]) - Phone [phone] Fax [phone]  Denial Date: 6/9/2023  Denial Rational:       Appeal Information:       Patient Notified: No

## 2023-06-09 NOTE — TELEPHONE ENCOUNTER
Wanting to appeal denial?    Patient did My Chart asking next steps.    Joao Andrews, RN, BSN, PHN  [facility]

## 2023-06-12 RX ORDER — METFORMIN HCL 500 MG
500 TABLET, EXTENDED RELEASE 24 HR ORAL
Qty: 30 TABLET | Refills: 1 | Status: SHIPPED | OUTPATIENT
Start: 2023-06-12 | End: 2023-08-10

## 2023-06-12 NOTE — TELEPHONE ENCOUNTER
Routing to provider    Willing to send metformin as an alternative of ozempic due to insurance?    Miladys Kowalski, RN

## 2023-08-10 DIAGNOSIS — R73.03 PREDIABETES: ICD-10-CM

## 2023-08-10 DIAGNOSIS — E66.01 MORBID OBESITY (H): ICD-10-CM

## 2023-08-10 RX ORDER — METFORMIN HCL 500 MG
500 TABLET, EXTENDED RELEASE 24 HR ORAL
Qty: 30 TABLET | Refills: 1 | Status: SHIPPED | OUTPATIENT
Start: 2023-08-10 | End: 2023-11-02

## 2023-09-13 ASSESSMENT — ANXIETY QUESTIONNAIRES
4. TROUBLE RELAXING: SEVERAL DAYS
3. WORRYING TOO MUCH ABOUT DIFFERENT THINGS: SEVERAL DAYS
IF YOU CHECKED OFF ANY PROBLEMS ON THIS QUESTIONNAIRE, HOW DIFFICULT HAVE THESE PROBLEMS MADE IT FOR YOU TO DO YOUR WORK, TAKE CARE OF THINGS AT HOME, OR GET ALONG WITH OTHER PEOPLE: SOMEWHAT DIFFICULT
5. BEING SO RESTLESS THAT IT IS HARD TO SIT STILL: NOT AT ALL
6. BECOMING EASILY ANNOYED OR IRRITABLE: SEVERAL DAYS
2. NOT BEING ABLE TO STOP OR CONTROL WORRYING: SEVERAL DAYS
7. FEELING AFRAID AS IF SOMETHING AWFUL MIGHT HAPPEN: SEVERAL DAYS
1. FEELING NERVOUS, ANXIOUS, OR ON EDGE: MORE THAN HALF THE DAYS
GAD7 TOTAL SCORE: 7
GAD7 TOTAL SCORE: 7

## 2023-09-13 ASSESSMENT — PATIENT HEALTH QUESTIONNAIRE - PHQ9
10. IF YOU CHECKED OFF ANY PROBLEMS, HOW DIFFICULT HAVE THESE PROBLEMS MADE IT FOR YOU TO DO YOUR WORK, TAKE CARE OF THINGS AT HOME, OR GET ALONG WITH OTHER PEOPLE: SOMEWHAT DIFFICULT
SUM OF ALL RESPONSES TO PHQ QUESTIONS 1-9: 8
SUM OF ALL RESPONSES TO PHQ QUESTIONS 1-9: 8

## 2023-09-14 ENCOUNTER — OFFICE VISIT (OUTPATIENT)
Dept: FAMILY MEDICINE | Facility: CLINIC | Age: 38
End: 2023-09-14
Payer: COMMERCIAL

## 2023-09-14 ENCOUNTER — TELEPHONE (OUTPATIENT)
Dept: FAMILY MEDICINE | Facility: CLINIC | Age: 38
End: 2023-09-14

## 2023-09-14 VITALS
HEART RATE: 64 BPM | HEIGHT: 66 IN | WEIGHT: 240 LBS | BODY MASS INDEX: 38.57 KG/M2 | DIASTOLIC BLOOD PRESSURE: 72 MMHG | TEMPERATURE: 98.4 F | RESPIRATION RATE: 20 BRPM | SYSTOLIC BLOOD PRESSURE: 118 MMHG | OXYGEN SATURATION: 99 %

## 2023-09-14 DIAGNOSIS — F41.1 GAD (GENERALIZED ANXIETY DISORDER): ICD-10-CM

## 2023-09-14 DIAGNOSIS — E66.01 MORBID OBESITY (H): ICD-10-CM

## 2023-09-14 DIAGNOSIS — R73.03 PREDIABETES: Primary | ICD-10-CM

## 2023-09-14 DIAGNOSIS — F33.1 MODERATE EPISODE OF RECURRENT MAJOR DEPRESSIVE DISORDER (H): ICD-10-CM

## 2023-09-14 LAB
ANION GAP SERPL CALCULATED.3IONS-SCNC: 12 MMOL/L (ref 7–15)
BUN SERPL-MCNC: 9.2 MG/DL (ref 6–20)
CALCIUM SERPL-MCNC: 9.7 MG/DL (ref 8.6–10)
CHLORIDE SERPL-SCNC: 102 MMOL/L (ref 98–107)
CREAT SERPL-MCNC: 0.73 MG/DL (ref 0.51–0.95)
DEPRECATED HCO3 PLAS-SCNC: 25 MMOL/L (ref 22–29)
EGFRCR SERPLBLD CKD-EPI 2021: >90 ML/MIN/1.73M2
GLUCOSE SERPL-MCNC: 117 MG/DL (ref 70–99)
HBA1C MFR BLD: 6.3 % (ref 0–5.6)
POTASSIUM SERPL-SCNC: 4.7 MMOL/L (ref 3.4–5.3)
SODIUM SERPL-SCNC: 139 MMOL/L (ref 136–145)

## 2023-09-14 PROCEDURE — 83036 HEMOGLOBIN GLYCOSYLATED A1C: CPT | Performed by: FAMILY MEDICINE

## 2023-09-14 PROCEDURE — 36415 COLL VENOUS BLD VENIPUNCTURE: CPT | Performed by: FAMILY MEDICINE

## 2023-09-14 PROCEDURE — 99214 OFFICE O/P EST MOD 30 MIN: CPT | Mod: 25 | Performed by: FAMILY MEDICINE

## 2023-09-14 PROCEDURE — 90746 HEPB VACCINE 3 DOSE ADULT IM: CPT | Performed by: FAMILY MEDICINE

## 2023-09-14 PROCEDURE — 90471 IMMUNIZATION ADMIN: CPT | Performed by: FAMILY MEDICINE

## 2023-09-14 PROCEDURE — 80048 BASIC METABOLIC PNL TOTAL CA: CPT | Performed by: FAMILY MEDICINE

## 2023-09-14 RX ORDER — BUSPIRONE HYDROCHLORIDE 15 MG/1
15 TABLET ORAL 2 TIMES DAILY
Qty: 180 TABLET | Refills: 1 | Status: SHIPPED | OUTPATIENT
Start: 2023-09-14 | End: 2024-04-26

## 2023-09-14 ASSESSMENT — PAIN SCALES - GENERAL: PAINLEVEL: NO PAIN (0)

## 2023-09-14 NOTE — PROGRESS NOTES
Assessment & Plan     Prediabetes  Her A1c is elevated despite the metformin.  She also is having diarrhea because of metformin.  She has failed her trial of metformin so we will proceed to Ozempic.  Recheck in 4 weeks for mood and dose adjustment of Ozempic                       - Hemoglobin A1c; Future  - Basic metabolic panel  (Ca, Cl, CO2, Creat, Gluc, K, Na, BUN); Future    Moderate episode of recurrent major depressive disorder (H)  Switch from paxil to prozac for weight loss   - FLUoxetine (PROZAC) 20 MG capsule; Take 1 capsule (20 mg) by mouth daily    ADELINA (generalized anxiety disorder)  Refill buspar  and start prozac   - FLUoxetine (PROZAC) 20 MG capsule; Take 1 capsule (20 mg) by mouth daily  - busPIRone (BUSPAR) 15 MG tablet; Take 1 tablet (15 mg) by mouth 2 times daily    Morbid obesity (H)  Meformin causing SE so may trial ozempeic       30 minutes spent by me on the date of the encounter doing chart review, history and exam, documentation and further activities per the note         Blanquita Schwab DO  [facility]    Joann Westfall is a 38 year old, presenting for the following health issues:  Chronic Disease Management        9/14/2023     7:11 AM   Additional Questions   Roomed by Sasha         9/14/2023     7:11 AM   Patient Reported Additional Medications   Patient reports taking the following new medications none       History of Present Illness       Mental Health Follow-up:  Patient presents to follow-up on Depression & Anxiety.Patient's depression since last visit has been:  No change  The patient is having other symptoms associated with depression.  Patient's anxiety since last visit has been:  No change  The patient is having other symptoms associated with anxiety.  Any significant life events: relationship concerns  Patient is feeling anxious or having panic attacks.  Patient has no concerns about alcohol or drug use.    Diabetes:   She presents for follow up of  diabetes.    She is not checking blood glucose.        She is concerned about other.    She is not experiencing numbness or burning in feet, excessive thirst, blurry vision, weight changes or redness, sores or blisters on feet.           She eats 2-3 servings of fruits and vegetables daily.She consumes 0 sweetened beverage(s) daily.She exercises with enough effort to increase her heart rate 10 to 19 minutes per day.  She exercises with enough effort to increase her heart rate 5 days per week. She is missing 1 dose(s) of medications per week.  She is not taking prescribed medications regularly due to remembering to take.     She had some diarrhea with the metformin.  She is noticing an increased appetite.  She started metformin about 3 months.      Wt Readings from Last 4 Encounters:   09/14/23 108.9 kg (240 lb)   06/09/23 107.5 kg (237 lb)   05/31/23 107.7 kg (237 lb 6.4 oz)   05/20/23 113.4 kg (250 lb)        Social History     Tobacco Use    Smoking status: Never     Passive exposure: Never    Smokeless tobacco: Never   Vaping Use    Vaping Use: Never used         4/14/2023     7:00 AM 6/9/2023     6:48 AM 9/13/2023    10:14 AM   PHQ   PHQ-9 Total Score 9 6 8   Q9: Thoughts of better off dead/self-harm past 2 weeks Not at all Not at all Not at all         4/14/2023     7:00 AM 6/9/2023     6:48 AM 9/13/2023    10:15 AM   ADELINA-7 SCORE   Total Score 10 (moderate anxiety) 5 (mild anxiety) 7 (mild anxiety)   Total Score 10 5 7         9/13/2023    10:14 AM   Last PHQ-9   1.  Little interest or pleasure in doing things 1   2.  Feeling down, depressed, or hopeless 0   3.  Trouble falling or staying asleep, or sleeping too much 2   4.  Feeling tired or having little energy 2   5.  Poor appetite or overeating 1   6.  Feeling bad about yourself 0   7.  Trouble concentrating 1   8.  Moving slowly or restless 1   Q9: Thoughts of better off dead/self-harm past 2 weeks 0   PHQ-9 Total Score 8         9/13/2023    10:15 AM  "  ADELINA-7    1. Feeling nervous, anxious, or on edge 2   2. Not being able to stop or control worrying 1   3. Worrying too much about different things 1   4. Trouble relaxing 1   5. Being so restless that it is hard to sit still 0   6. Becoming easily annoyed or irritable 1   7. Feeling afraid, as if something awful might happen 1   ADELINA-7 Total Score 7   If you checked any problems, how difficult have they made it for you to do your work, take care of things at home, or get along with other people? Somewhat difficult       Suicide Assessment Five-step Evaluation and Treatment (SAFE-T)      Review of Systems   Constitutional, HEENT, cardiovascular, pulmonary, gi and gu systems are negative, except as otherwise noted.      Objective    /72 (BP Location: Right arm, Patient Position: Sitting, Cuff Size: Adult Large)   Pulse 64   Temp 98.4  F (36.9  C) (Oral)   Resp 20   Ht 1.676 m (5' 6\")   Wt 108.9 kg (240 lb)   LMP 08/31/2023   SpO2 99%   BMI 38.74 kg/m    Body mass index is 38.74 kg/m .  Physical Exam   GENERAL: healthy, alert and no distress  EYES: Eyes grossly normal to inspection, PERRL and conjunctivae and sclerae normal  HENT: ear canals and TM's normal, nose and mouth without ulcers or lesions  NECK: no adenopathy, no asymmetry, masses, or scars and thyroid normal to palpation  RESP: lungs clear to auscultation - no rales, rhonchi or wheezes  CV: regular rate and rhythm, normal S1 S2, no S3 or S4, no murmur, click or rub, no peripheral edema and peripheral pulses strong  PSYCH: mentation appears normal, affect normal/bright    No results found for this or any previous visit (from the past 24 hour(s)).                  "

## 2023-09-14 NOTE — TELEPHONE ENCOUNTER
Prior Authorization Retail Medication Request    Medication/Dose: semaglutide (OZEMPIC) 2 MG/3ML pen  ICD code (if different than what is on RX):  NA  Previously Tried and Failed:  NA  Rationale:  NA    Insurance Name:  NA  Insurance ID:  PT ID #[id number]      Pharmacy Information (if different than what is on RX)  [facility]  Phone:  [facility]    The patients plan does not cover the prescribed drug without a prior authorization. Please contact the plan at [phone] to initiate a PA or call/fax the pharmacy to change medication.  Patient ID # is [id number]

## 2023-09-18 NOTE — TELEPHONE ENCOUNTER
Central Prior Authorization Team   Phone: [phone]    PA Initiation    Medication: OZEMPIC (0.25 OR 0.5 MG/DOSE) 2 MG/3ML SC SOPN  Insurance Company: [employer] ([facility]) - Phone [phone] Fax [phone]  Pharmacy Filling the Rx: [id number] [address] AT [address] [place] [address] & [facility]  Filling Pharmacy Phone: [phone]  Filling Pharmacy Fax:    Start Date: 9/18/2023

## 2023-09-20 ENCOUNTER — MYC MEDICAL ADVICE (OUTPATIENT)
Dept: FAMILY MEDICINE | Facility: CLINIC | Age: 38
End: 2023-09-20
Payer: COMMERCIAL

## 2023-09-20 DIAGNOSIS — R73.03 PREDIABETES: ICD-10-CM

## 2023-09-20 DIAGNOSIS — E66.01 MORBID OBESITY (H): Primary | ICD-10-CM

## 2023-09-20 NOTE — TELEPHONE ENCOUNTER
PRIOR AUTHORIZATION DENIED    Medication: OZEMPIC (0.25 OR 0.5 MG/DOSE) [facility]  Insurance Company: [employer] ([facility]) - Phone [phone] Fax [phone]  Denial Date: 9/18/2023  Denial Rational:           Appeal Information:         Patient Notified: No

## 2023-09-21 ENCOUNTER — TELEPHONE (OUTPATIENT)
Dept: FAMILY MEDICINE | Facility: CLINIC | Age: 38
End: 2023-09-21
Payer: COMMERCIAL

## 2023-09-21 NOTE — TELEPHONE ENCOUNTER
See message [employer] message 9/20/23- looks like another alternative was sent in.    Sasha Smith MA

## 2023-09-21 NOTE — TELEPHONE ENCOUNTER
Prior Authorization Retail Medication Request    Medication/Dose: Semaglutide-Weight Management (WEGOVY) 0.25 MG/0.5ML pen    ICD code (if different than what is on RX):  na  Previously Tried and Failed:  na  Rationale:  na    Insurance Name:  na  Insurance ID:  PT ID # [id number]      Pharmacy Information (if different than what is on RX)  [facility]:  [facility]  Phone:  [facility]    The patients plan does not cover the prescribed drug without a prior authorization. Please contact the plan at [phone] to initiate a PA or call/fax the pharmacy to change medication.  Patient ID # is [id number]

## 2023-09-23 NOTE — TELEPHONE ENCOUNTER
PA Initiation    Medication: SEMAGLUTIDE-WEIGHT MANAGEMENT 0.25 MG/0.5ML SC SOAJ  Insurance Company: [employer] ([facility]) - Phone [phone] Fax [phone]  Pharmacy Filling the Rx: [id number] [address] AT [address] [place] [address] & [facility]  Filling Pharmacy Phone: [phone]  Filling Pharmacy Fax: [phone]  Start Date: 9/23/2023

## 2023-09-26 NOTE — TELEPHONE ENCOUNTER
PRIOR AUTHORIZATION DENIED    Medication: SEMAGLUTIDE-WEIGHT MANAGEMENT 0.25 MG/0.5ML SC SOAJ  Insurance Company: [employer] ([facility]) - Phone [phone] Fax [phone]  Denial Date: 9/23/2023  Denial Rational: Plan exclusion, no path to coverage  Appeal Information: N/A  Patient Notified: No, clinic must notify      Third party started PA, it was denied. Plan unable to update fax to send denial letter to PA team. Verbal denial reasons accepted

## 2023-09-28 ENCOUNTER — MYC MEDICAL ADVICE (OUTPATIENT)
Dept: FAMILY MEDICINE | Facility: CLINIC | Age: 38
End: 2023-09-28
Payer: COMMERCIAL

## 2023-09-28 DIAGNOSIS — F33.1 MODERATE EPISODE OF RECURRENT MAJOR DEPRESSIVE DISORDER (H): Primary | ICD-10-CM

## 2023-09-28 NOTE — TELEPHONE ENCOUNTER
Routing to provider     Alternatives for prozac?     Feeling nausea since starting    Review/advise    Miladys Kowalski, RN

## 2023-11-02 DIAGNOSIS — R73.03 PREDIABETES: ICD-10-CM

## 2023-11-02 DIAGNOSIS — E66.01 MORBID OBESITY (H): ICD-10-CM

## 2023-11-02 RX ORDER — METFORMIN HCL 500 MG
500 TABLET, EXTENDED RELEASE 24 HR ORAL
Qty: 90 TABLET | Refills: 0 | Status: SHIPPED | OUTPATIENT
Start: 2023-11-02 | End: 2024-01-05

## 2023-11-03 ENCOUNTER — MYC MEDICAL ADVICE (OUTPATIENT)
Dept: FAMILY MEDICINE | Facility: CLINIC | Age: 38
End: 2023-11-03
Payer: COMMERCIAL

## 2023-11-03 DIAGNOSIS — R73.03 PREDIABETES: Primary | ICD-10-CM

## 2023-11-06 DIAGNOSIS — E66.01 MORBID OBESITY (H): ICD-10-CM

## 2023-11-06 DIAGNOSIS — R73.03 PREDIABETES: ICD-10-CM

## 2023-11-06 NOTE — TELEPHONE ENCOUNTER
Pt calling to request for script to be filled at [facility] as preferred does not have this script in stock.       Disp Refills Start End ERIKA    semaglutide (OZEMPIC) 2 MG/3ML pen 3 mL 0 9/14/2023 11/9/2023 No   Sig - Route: Inject 0.25 mg Subcutaneous every 7 days for 56 days - Subcutaneous   Sent to pharmacy as: Semaglutide(0.25 or 0.5MG/DOS) 2 MG/3ML Solution Pen-injector (OZEMPIC)   Class: E-Prescribe   Order: [id number]       RN attempted to call [id number] to cancel script, call ended due to waiting on phone >10 mins.     Routing to pcp to advise on script.     Ginny Hood RN on 11/6/2023 at 10:59 AM

## 2023-11-06 NOTE — TELEPHONE ENCOUNTER
Pt has decided to pay out of pocket for Ozempic.  Requesting pending strength be sent to pharmacy next door-        Genny RN    Triage Nurse  [facility]

## 2023-11-14 ENCOUNTER — MYC MEDICAL ADVICE (OUTPATIENT)
Dept: FAMILY MEDICINE | Facility: CLINIC | Age: 38
End: 2023-11-14
Payer: COMMERCIAL

## 2023-11-20 DIAGNOSIS — F41.1 GAD (GENERALIZED ANXIETY DISORDER): ICD-10-CM

## 2023-11-20 DIAGNOSIS — F33.1 MODERATE EPISODE OF RECURRENT MAJOR DEPRESSIVE DISORDER (H): ICD-10-CM

## 2023-12-12 ENCOUNTER — MYC MEDICAL ADVICE (OUTPATIENT)
Dept: FAMILY MEDICINE | Facility: CLINIC | Age: 38
End: 2023-12-12
Payer: COMMERCIAL

## 2023-12-12 DIAGNOSIS — R73.03 PREDIABETES: Primary | ICD-10-CM

## 2023-12-12 NOTE — TELEPHONE ENCOUNTER
/Routing [employer] message to PC/P      Pt asking for repeat A1C and glucose.   She last had these done .  Pt would like to see where she is at with her diet, medication routine bofore the holidays.      Willing to place orders?        Genny, RN    Triage Nurse  [facility]

## 2023-12-15 ENCOUNTER — LAB (OUTPATIENT)
Dept: LAB | Facility: CLINIC | Age: 38
End: 2023-12-15
Payer: COMMERCIAL

## 2023-12-15 DIAGNOSIS — R73.03 PREDIABETES: ICD-10-CM

## 2023-12-15 LAB
ANION GAP SERPL CALCULATED.3IONS-SCNC: 10 MMOL/L (ref 7–15)
BUN SERPL-MCNC: 7 MG/DL (ref 6–20)
CALCIUM SERPL-MCNC: 9.5 MG/DL (ref 8.6–10)
CHLORIDE SERPL-SCNC: 104 MMOL/L (ref 98–107)
CREAT SERPL-MCNC: 0.75 MG/DL (ref 0.51–0.95)
DEPRECATED HCO3 PLAS-SCNC: 24 MMOL/L (ref 22–29)
EGFRCR SERPLBLD CKD-EPI 2021: >90 ML/MIN/1.73M2
GLUCOSE SERPL-MCNC: 102 MG/DL (ref 70–99)
HBA1C MFR BLD: 5.4 % (ref 0–5.6)
POTASSIUM SERPL-SCNC: 4.2 MMOL/L (ref 3.4–5.3)
SODIUM SERPL-SCNC: 138 MMOL/L (ref 135–145)

## 2023-12-15 PROCEDURE — 80048 BASIC METABOLIC PNL TOTAL CA: CPT

## 2023-12-15 PROCEDURE — 83036 HEMOGLOBIN GLYCOSYLATED A1C: CPT

## 2023-12-15 PROCEDURE — 36415 COLL VENOUS BLD VENIPUNCTURE: CPT

## 2024-01-05 ENCOUNTER — OFFICE VISIT (OUTPATIENT)
Dept: FAMILY MEDICINE | Facility: CLINIC | Age: 39
End: 2024-01-05
Attending: FAMILY MEDICINE
Payer: COMMERCIAL

## 2024-01-05 VITALS
WEIGHT: 216 LBS | HEART RATE: 60 BPM | TEMPERATURE: 97.9 F | BODY MASS INDEX: 34.72 KG/M2 | DIASTOLIC BLOOD PRESSURE: 74 MMHG | OXYGEN SATURATION: 100 % | HEIGHT: 66 IN | RESPIRATION RATE: 20 BRPM | SYSTOLIC BLOOD PRESSURE: 116 MMHG

## 2024-01-05 DIAGNOSIS — R73.03 PREDIABETES: ICD-10-CM

## 2024-01-05 DIAGNOSIS — F33.1 MODERATE EPISODE OF RECURRENT MAJOR DEPRESSIVE DISORDER (H): Primary | ICD-10-CM

## 2024-01-05 DIAGNOSIS — E66.01 MORBID OBESITY (H): ICD-10-CM

## 2024-01-05 DIAGNOSIS — L72.3 SEBACEOUS CYST: ICD-10-CM

## 2024-01-05 DIAGNOSIS — K59.03 DRUG-INDUCED CONSTIPATION: ICD-10-CM

## 2024-01-05 PROCEDURE — 90686 IIV4 VACC NO PRSV 0.5 ML IM: CPT | Performed by: FAMILY MEDICINE

## 2024-01-05 PROCEDURE — 91320 SARSCV2 VAC 30MCG TRS-SUC IM: CPT | Performed by: FAMILY MEDICINE

## 2024-01-05 PROCEDURE — 90480 ADMN SARSCOV2 VAC 1/ONLY CMP: CPT | Performed by: FAMILY MEDICINE

## 2024-01-05 PROCEDURE — 96127 BRIEF EMOTIONAL/BEHAV ASSMT: CPT | Performed by: FAMILY MEDICINE

## 2024-01-05 PROCEDURE — 90471 IMMUNIZATION ADMIN: CPT | Performed by: FAMILY MEDICINE

## 2024-01-05 PROCEDURE — 90472 IMMUNIZATION ADMIN EACH ADD: CPT | Performed by: FAMILY MEDICINE

## 2024-01-05 PROCEDURE — 90746 HEPB VACCINE 3 DOSE ADULT IM: CPT | Performed by: FAMILY MEDICINE

## 2024-01-05 PROCEDURE — 99214 OFFICE O/P EST MOD 30 MIN: CPT | Mod: 25 | Performed by: FAMILY MEDICINE

## 2024-01-05 RX ORDER — PAROXETINE 40 MG/1
40 TABLET, FILM COATED ORAL EVERY MORNING
Qty: 90 TABLET | Refills: 3 | Status: SHIPPED | OUTPATIENT
Start: 2024-01-05

## 2024-01-05 RX ORDER — PAROXETINE 40 MG/1
40 TABLET, FILM COATED ORAL EVERY MORNING
COMMUNITY
Start: 2023-10-17 | End: 2024-01-05

## 2024-01-05 ASSESSMENT — ANXIETY QUESTIONNAIRES
GAD7 TOTAL SCORE: 6
7. FEELING AFRAID AS IF SOMETHING AWFUL MIGHT HAPPEN: SEVERAL DAYS
1. FEELING NERVOUS, ANXIOUS, OR ON EDGE: SEVERAL DAYS
2. NOT BEING ABLE TO STOP OR CONTROL WORRYING: SEVERAL DAYS
GAD7 TOTAL SCORE: 6
IF YOU CHECKED OFF ANY PROBLEMS ON THIS QUESTIONNAIRE, HOW DIFFICULT HAVE THESE PROBLEMS MADE IT FOR YOU TO DO YOUR WORK, TAKE CARE OF THINGS AT HOME, OR GET ALONG WITH OTHER PEOPLE: SOMEWHAT DIFFICULT
5. BEING SO RESTLESS THAT IT IS HARD TO SIT STILL: NOT AT ALL
6. BECOMING EASILY ANNOYED OR IRRITABLE: SEVERAL DAYS
7. FEELING AFRAID AS IF SOMETHING AWFUL MIGHT HAPPEN: SEVERAL DAYS
3. WORRYING TOO MUCH ABOUT DIFFERENT THINGS: SEVERAL DAYS
GAD7 TOTAL SCORE: 6
4. TROUBLE RELAXING: SEVERAL DAYS
8. IF YOU CHECKED OFF ANY PROBLEMS, HOW DIFFICULT HAVE THESE MADE IT FOR YOU TO DO YOUR WORK, TAKE CARE OF THINGS AT HOME, OR GET ALONG WITH OTHER PEOPLE?: SOMEWHAT DIFFICULT

## 2024-01-05 ASSESSMENT — PAIN SCALES - GENERAL: PAINLEVEL: NO PAIN (0)

## 2024-01-05 ASSESSMENT — ENCOUNTER SYMPTOMS: NERVOUS/ANXIOUS: 1

## 2024-01-05 ASSESSMENT — PATIENT HEALTH QUESTIONNAIRE - PHQ9
SUM OF ALL RESPONSES TO PHQ QUESTIONS 1-9: 6
10. IF YOU CHECKED OFF ANY PROBLEMS, HOW DIFFICULT HAVE THESE PROBLEMS MADE IT FOR YOU TO DO YOUR WORK, TAKE CARE OF THINGS AT HOME, OR GET ALONG WITH OTHER PEOPLE: SOMEWHAT DIFFICULT
SUM OF ALL RESPONSES TO PHQ QUESTIONS 1-9: 6

## 2024-01-05 NOTE — NURSING NOTE
Prior to immunization administration, verified patients identity using patient s name and date of birth. Please see Immunization Activity for additional information.     Screening Questionnaire for Adult Immunization    Are you sick today?   No   Do you have allergies to medications, food, a vaccine component or latex?   No   Have you ever had a serious reaction after receiving a vaccination?   No   Do you have a long-term health problem with heart, lung, kidney, or metabolic disease (e.g., diabetes), asthma, a blood disorder, no spleen, complement component deficiency, a cochlear implant, or a spinal fluid leak?  Are you on long-term aspirin therapy?   No   Do you have cancer, leukemia, HIV/AIDS, or any other immune system problem?   No   Do you have a parent, brother, or sister with an immune system problem?   No   In the past 3 months, have you taken medications that affect  your immune system, such as prednisone, other steroids, or anticancer drugs; drugs for the treatment of rheumatoid arthritis, Crohn s disease, or psoriasis; or have you had radiation treatments?   No   Have you had a seizure, or a brain or other nervous system problem?   No   During the past year, have you received a transfusion of blood or blood    products, or been given immune (gamma) globulin or antiviral drug?   No   For women: Are you pregnant or is there a chance you could become       pregnant during the next month?   No   Have you received any vaccinations in the past 4 weeks?   No     Immunization questionnaire answers were all negative.      Patient instructed to remain in clinic for 15 minutes afterwards, and to report any adverse reactions.     Screening performed by Sasha Smith CMA on 1/5/2024 at 7:34 AM.

## 2024-01-05 NOTE — PROGRESS NOTES
"  Assessment & Plan     Moderate episode of recurrent major depressive disorder (H)  The patient trialed Prozac and Zoloft and had side effects to both.  She is returned to taking Paxil as this is working well enough.  - PARoxetine (PAXIL) 40 MG tablet; Take 1 tablet (40 mg) by mouth every morning    Prediabetes  The patient has paid out-of-pocket for the lower dose of semaglutide.  Will increase her to the next dose of 1 mg weekly.  I have prescribed both Ozempic and Wegovy to see which 1 is a less expensive for her.  - Semaglutide, 1 MG/DOSE, (OZEMPIC) 4 MG/3ML pen; Inject 1 mg Subcutaneous every 7 days  - Semaglutide-Weight Management (WEGOVY) 1 MG/0.5ML pen; Inject 1 mg Subcutaneous once a week    Morbid obesity (H)  Patient has already lost 20 pounds with Ozempic  - Semaglutide, 1 MG/DOSE, (OZEMPIC) 4 MG/3ML pen; Inject 1 mg Subcutaneous every 7 days  - Semaglutide-Weight Management (WEGOVY) 1 MG/0.5ML pen; Inject 1 mg Subcutaneous once a week    Drug-induced constipation  Likely caused by the Ozempic.  Recommended over-the-counter MiraLAX daily    Sebaceous cyst  As this has opened up will refer to general surgery for removal  - Adult General Surg Referral; Future      30 minutes spent by me on the date of the encounter doing chart review, history and exam, documentation and further activities per the note       BMI:   Estimated body mass index is 34.86 kg/m  as calculated from the following:    Height as of this encounter: 1.676 m (5' 6\").    Weight as of this encounter: 98 kg (216 lb).   Dietary changes and using Ozempic    Follow-up in 4 to 5 months to recheck prediabetes and weight    DO LOR[facility]    Joann Westfall is a 38 year old, presenting for the following health issues:  Depression and Anxiety        1/5/2024     6:51 AM   Additional Questions   Roomed by Sasha GONZALEZ         1/5/2024     6:51 AM   Patient Reported Additional Medications   Patient reports " taking the following new medications Paxil 40mg       --- Also has a lump on upper left back she wants looked at.  It has been a year and has occasional discharge.   It is not red or painful currently.        History of Present Illness       Mental Health Follow-up:  Patient presents to follow-up on Depression & Anxiety.Patient's depression since last visit has been:  Better  The patient is not having other symptoms associated with depression.  Patient's anxiety since last visit has been:  Good  The patient is not having other symptoms associated with anxiety.  Any significant life events: health concerns  Patient is feeling anxious or having panic attacks.  Patient has no concerns about alcohol or drug use.    Diabetes:   She presents for follow up of diabetes.    She is not checking blood glucose.        She is concerned about other.    She is not experiencing numbness or burning in feet, excessive thirst, blurry vision, weight changes or redness, sores or blisters on feet.           She eats 2-3 servings of fruits and vegetables daily.She consumes 0 sweetened beverage(s) daily.She exercises with enough effort to increase her heart rate 20 to 29 minutes per day.  She exercises with enough effort to increase her heart rate 4 days per week.   She is taking medications regularly.     Social History     Tobacco Use    Smoking status: Never     Passive exposure: Never    Smokeless tobacco: Never   Vaping Use    Vaping Use: Never used         6/9/2023     6:48 AM 9/13/2023    10:14 AM 1/5/2024     6:48 AM   PHQ   PHQ-9 Total Score 6 8 6   Q9: Thoughts of better off dead/self-harm past 2 weeks Not at all Not at all Not at all         6/9/2023     6:48 AM 9/13/2023    10:15 AM 1/5/2024     6:48 AM   ADELINA-7 SCORE   Total Score 5 (mild anxiety) 7 (mild anxiety) 6 (mild anxiety)   Total Score 5 7 6         1/5/2024     6:48 AM   Last PHQ-9   1.  Little interest or pleasure in doing things 1   2.  Feeling down, depressed, or  "hopeless 1   3.  Trouble falling or staying asleep, or sleeping too much 1   4.  Feeling tired or having little energy 2   5.  Poor appetite or overeating 0   6.  Feeling bad about yourself 0   7.  Trouble concentrating 1   8.  Moving slowly or restless 0   Q9: Thoughts of better off dead/self-harm past 2 weeks 0   PHQ-9 Total Score 6         1/5/2024     6:48 AM   ADELINA-7    1. Feeling nervous, anxious, or on edge 1   2. Not being able to stop or control worrying 1   3. Worrying too much about different things 1   4. Trouble relaxing 1   5. Being so restless that it is hard to sit still 0   6. Becoming easily annoyed or irritable 1   7. Feeling afraid, as if something awful might happen 1   ADELINA-7 Total Score 6   If you checked any problems, how difficult have they made it for you to do your work, take care of things at home, or get along with other people? Somewhat difficult       Suicide Assessment Five-step Evaluation and Treatment (SAFE-T)      The patient had diarrhea from metformin and was prescribed Ozempic instead for her obesity and prediabetes 3 months ago.     She felt nauseated on prozac.  She felt worse on zoloft.  She is feeling stable on paxil.      Wt Readings from Last 4 Encounters:   01/05/24 98 kg (216 lb)   09/14/23 108.9 kg (240 lb)   06/09/23 107.5 kg (237 lb)   05/31/23 107.7 kg (237 lb 6.4 oz)        Review of Systems   Psychiatric/Behavioral:  The patient is nervous/anxious.       Constitutional, HEENT, cardiovascular, pulmonary, gi and gu systems are negative, except as otherwise noted.      Objective    /74 (BP Location: Right arm, Patient Position: Sitting, Cuff Size: Adult Large)   Pulse 60   Temp 97.9  F (36.6  C) (Oral)   Resp 20   Ht 1.676 m (5' 6\")   Wt 98 kg (216 lb)   LMP 12/19/2023   SpO2 100%   BMI 34.86 kg/m    Body mass index is 34.86 kg/m .  Physical Exam   GENERAL: healthy, alert and no distress  NECK: no adenopathy, no asymmetry, masses, or scars and thyroid " normal to palpation  RESP: lungs clear to auscultation - no rales, rhonchi or wheezes  CV: regular rate and rhythm, normal S1 S2, no S3 or S4, no murmur, click or rub, no peripheral edema and peripheral pulses strong  MS: no gross musculoskeletal defects noted, no edema  SKIN: Flat sebaceous cyst with a pore on her upper back  PSYCH: mentation appears normal, affect normal/bright    No results found for this or any previous visit (from the past 24 hour(s)).

## 2024-01-19 ENCOUNTER — OFFICE VISIT (OUTPATIENT)
Dept: SURGERY | Facility: CLINIC | Age: 39
End: 2024-01-19
Payer: COMMERCIAL

## 2024-01-19 VITALS
HEART RATE: 72 BPM | DIASTOLIC BLOOD PRESSURE: 86 MMHG | HEIGHT: 66 IN | SYSTOLIC BLOOD PRESSURE: 124 MMHG | WEIGHT: 212.6 LBS | BODY MASS INDEX: 34.17 KG/M2

## 2024-01-19 DIAGNOSIS — L72.3 SEBACEOUS CYST: ICD-10-CM

## 2024-01-19 PROCEDURE — 99203 OFFICE O/P NEW LOW 30 MIN: CPT | Performed by: SURGERY

## 2024-01-19 RX ORDER — SULFAMETHOXAZOLE AND TRIMETHOPRIM 400; 80 MG/1; MG/1
1 TABLET ORAL 2 TIMES DAILY
Qty: 28 TABLET | Refills: 1 | Status: SHIPPED | OUTPATIENT
Start: 2024-01-19 | End: 2024-04-26

## 2024-01-19 ASSESSMENT — PAIN SCALES - GENERAL: PAINLEVEL: NO PAIN (0)

## 2024-01-19 NOTE — LETTER
1/19/2024         RE: Malou Gomes  [address] [address]        Dear Colleague,    Thank you for referring your patient, Malou Gomes, to the [facility]. Please see a copy of my visit note below.    Sebaceous cyst  As this has opened up will refer to general surgery for removal  - Adult General Surg Referral; Future    Dear Blanquita Schroeder  I was asked to see this patient by Blanquita Schwab for please see below.  I have seen Malou Gomes and as you know his chief complaint is on the left upper back is a sebaceous cyst that was infected and tender and much larger than now.   Today does not look infected and is not tender.     HPI:  Patient is a 38 year old female  with complaints see above      Review Of Systems  Respiratory: No shortness of breath, dyspnea on exertion, cough, or hemoptysis  Cardiovascular: negative    Endocrine: negative and was pre diabetic but with ozempic has her AIC is normal.       10 Point review of systems all others are negative.   LMP 12/19/2023     No past medical history on file.    No past surgical history on file.    Social History     Socioeconomic History     Marital status: Single     Spouse name: Not on file     Number of children: Not on file     Years of education: Not on file     Highest education level: Not on file   Occupational History     Not on file   Tobacco Use     Smoking status: Never     Passive exposure: Never     Smokeless tobacco: Never   Vaping Use     Vaping Use: Never used   Substance and Sexual Activity     Alcohol use: Not on file     Drug use: Not on file     Sexual activity: Not on file   Other Topics Concern     Not on file   Social History Narrative     Not on file     Social Determinants of Health     Financial Resource Strain: Low Risk  (1/5/2024)    Financial Resource Strain      Within the past 12 months, have you or your family members you live with been unable to get utilities (heat, electricity) when it was  really needed?: No   Food Insecurity: Low Risk  (1/5/2024)    Food Insecurity      Within the past 12 months, did you worry that your food would run out before you got money to buy more?: No      Within the past 12 months, did the food you bought just not last and you didn t have money to get more?: No   Transportation Needs: Low Risk  (1/5/2024)    Transportation Needs      Within the past 12 months, has lack of transportation kept you from medical appointments, getting your medicines, non-medical meetings or appointments, work, or from getting things that you need?: No   Physical Activity: Not on file   Stress: Not on file   Social Connections: Not on file   Interpersonal Safety: Low Risk  (1/5/2024)    Interpersonal Safety      Do you feel physically and emotionally safe where you currently live?: Yes      Within the past 12 months, have you been hit, slapped, kicked or otherwise physically hurt by someone?: No      Within the past 12 months, have you been humiliated or emotionally abused in other ways by your partner or ex-partner?: No   Housing Stability: Low Risk  (1/5/2024)    Housing Stability      Do you have housing? : Yes      Are you worried about losing your housing?: No       Current Outpatient Medications   Medication Sig Dispense Refill     busPIRone (BUSPAR) 15 MG tablet Take 1 tablet (15 mg) by mouth 2 times daily 180 tablet 1     LORazepam (ATIVAN) 1 MG tablet TAKE 1/2 TO 1 TABLET BY MOUTH DAILY AS NEEDED       PARoxetine (PAXIL) 40 MG tablet Take 1 tablet (40 mg) by mouth every morning 90 tablet 3     semaglutide (OZEMPIC) 2 MG/3ML pen Inject 0.5 mg Subcutaneous every 7 days 3 mL 1     Semaglutide, 1 MG/DOSE, (OZEMPIC) 4 MG/3ML pen Inject 1 mg Subcutaneous every 7 days 3 mL 4     Semaglutide-Weight Management (WEGOVY) 1 MG/0.5ML pen Inject 1 mg Subcutaneous once a week 2 mL 0       Above was reviewed  Physical exam: [facility] 12/19/2023    Patient able to get up on table without difficulty.   Patient  is alert and orientated.   Head eyes, nose and mouth within normal limits.        Has on the left upper back a 2 by 1 cm sebaceous cyst with the opening in the middle of it.   Skin was warm and pink  Normal Affect    Lower extremity edema is not present.    Labs show:           Component  Ref Range & Units 1 mo ago 4 mo ago 9 mo ago    Hemoglobin A1C  0.0 - 5.6 % 5.4 6.3 High  CM 6.1 High  CM   Comment: Normal <5.7%  Prediabetes 5.7-6.4%    Diabetes 6.5% or higher     Note: Adopted from ADA consensus guidelines.   Resulting              Assessment:   Has on the left upper back a 2 by 1 cm sebaceous cyst with the opening in the middle of it.    Plan to do patient is good with doing this in the office. .         Discussed that  We can remove the cyst now but will have to leave it open. And may have to pack it.  Since antibiotics are working will have her Citizen of [place] it out and then give another 14 days worth to take 7 days BEFORE SURGERY AND THEN 7 DAYS AFTER SURGERY TO REMOVE IT.   If not getting better may have to just remove and leave open.  If comes back before than will have patient restart the antibiotics.   Plan to do set up a time in 2-3 weeks to excise the cyst.  .     Risks of surgery include damage to nerves, bleeding, infection, damage to  Vessels, recurrence.           Time spent with the patient with greater that 50% of the time in discussion was 30 minutes.  In discussing the plan.      Girish Bang MD       Again, thank you for allowing me to participate in the care of your patient.        Sincerely,        Girish Bang MD

## 2024-01-19 NOTE — PATIENT INSTRUCTIONS
Assessment:   Has on the left upper back a 2 by 1 cm sebaceous cyst with the opening in the middle of it.    Plan to do patient is good with doing this in the office. .         Discussed that  We can remove the cyst now but will have to leave it open. And may have to pack it.  Since antibiotics are working will have her [place] it out and then give another 14 days worth to take 7 days BEFORE SURGERY AND THEN 7 DAYS AFTER SURGERY TO REMOVE IT.   If not getting better may have to just remove and leave open.  If comes back before than will have patient restart the antibiotics.   Plan to do set up a time in 2-3 weeks to excise the cyst.  .     Risks of surgery include damage to nerves, bleeding, infection, damage to  Vessels, recurrence.        SKIN AND SUBCUTANEOUS SURGERY DISCHARGE INSTRUCTIONS  DR. JAMES YOUSSEF    1. You may resume your regular diet when you feel you are ready to. DO NOT drink alcoholic beverages for 24 hours or while you are taking prescription medication.    2. Limit your activities for the first 48 hours. Gradually, increase them as tolerated. You may use stairs. I encourage you to walk as tolerated. Please do not do things that will pull on your incision as this may cause it to open up.     3. You will have some discomfort at the incision sites. This is expected. This should improve over the next 2-3 days. Ice and pain medication will help with this pain. Use prescribed pain medication as instructed.    4. Bruising and mild swelling is normal after surgery. The area below and around the incision(s) will be hard and elevated. This is normal. I call it the healing ridge. This will resolve slowly over the next several months. If you feel the pain is increasing and cannot explain it by increasing activity please call us at [phone].    5. The dressing will often have some blood on it. You may shower 24 hours after surgery. No baths for 2 weeks after surgery.  Clean gently over incision site.  If clear plastic covering or steri-strip comes off and there is still some bleeding or drainage then cover with gauze or band-aid. If no bleeding, there is no reason to cover site. If you were given an abdominal binder at time of surgery it may be removed after 24 hours after surgery. You may continue to wear it however for comfort. I suggest  you wear an old t-shirt under the abdominal binder for a more comfortable wear.    6. Avoid Aspirin for the first 72 hours after the procedure. This medication may increase the tendency to bleed.    7. Use the following medications (in addition to your normal meds) as shown:  a. Percocet 5 mg 1-2 every 6 hours as needed for severe pain. This contains 325 mg of Tylenol (acetaminophen) per tablet.  Please do not take more than 4 grams of Tylenol (acetaminophen) per day. For example, you may take 1 Percocet and 1 Tylenol, or 2 Percocet and no Tylenol, or 2 Tylenol and no Percocet every 6 hours.  b. Tylenol (acetaminophen) 500 mg every 6 hours as needed for mild pain. Do not take more than 1000 mg every 6 hours. (see above).  c. Motrin (ibuprofen) 200-800 mg every 6 hours as needed for mild to moderate pain. Take with food.     8. Notify Dr. Bang's clinic at [phone] if:  Your discomfort is not relieved by your pain medication.  You have signs of infection such as temperature above 100.5 degrees orally, chills, or increasing daily discomfort.  Incision site is becoming more red and/or there is purulent drainage.  You have questions or concerns.    9. Please call [phone], for  [facility] or  for [facility], to schedule a follow up appointment in 2 weeks.       10. When taking narcotics (pain medication more than Tylenol [acetaminophen] and Motrin [ibuprofen]) it is important to keep your stools soft to avoid constipation and pain with straining. This is best done by drinking fluids (non-alcoholic and non-caffeinated) and taking  a stool softener (i.e. Metamucil or milk of magnesia). You may be able to use non-narcotics for pain relief especially by the 3rd post- operative day. Tylenol (acetaminophen) 500 mg every 6 hours and/or Motrin (ibuprofen) 200-800 mg every 6 hours. Please do not take more than 4 grams of Tylenol (acetaminophen) per day. Remember your Percocet does have Tylenol (acetaminophen) already in it. Please take Motrin (ibuprofen) with food to help protect the stomach. If you have a history of stomach ulcers or stomach problems, do not take Motrin (ibuprofen).     11. Do not drive or operate heavy machinery for 24 hours after surgery or when taking narcotics. You may resume driving when feel that you can safely avoid an accident and are not taking narcotics. This is usually 5 to 7 days after surgery. You should not be alone for 24 hours after surgery.    12. Have milk of magnesia available at home so that when you take the pain medications you take 1-2 teaspoons a day,  to help reduce problems with constipation.      13. If you have questions after your procedure/surgery please contact Dr Bang's primary clinic in [place]. You can call [phone], your other option is to send us a [employer] message through your [employer] portal to Dr Bang's team. For urgent and non urgent matters these options are best. If your symptoms are emergent or cannot wait, please proceed to [facility] and let them know who you had surgery with.  For [facility]:  Please call  for [facility] clinic, to schedule a follow up appointment in 2 weeks.  Or for questions or problems.         Patient can receive pain medications that I have ordered and give the first dose in the recovery room as needed.

## 2024-01-19 NOTE — PROGRESS NOTES
Sebaceous cyst  As this has opened up will refer to general surgery for removal  - Adult General Surg Referral; Future    Dear Blanquita Schroeder  I was asked to see this patient by Blanquita Schwab for please see below.  I have seen Malou Gomes and as you know his chief complaint is on the left upper back is a sebaceous cyst that was infected and tender and much larger than now.   Today does not look infected and is not tender.     HPI:  Patient is a 38 year old female  with complaints see above      Review Of Systems  Respiratory: No shortness of breath, dyspnea on exertion, cough, or hemoptysis  Cardiovascular: negative    Endocrine: negative and was pre diabetic but with ozempic has her AIC is normal.       10 Point review of systems all others are negative.   LMP 12/19/2023     No past medical history on file.    No past surgical history on file.    Social History     Socioeconomic History    Marital status: Single     Spouse name: Not on file    Number of children: Not on file    Years of education: Not on file    Highest education level: Not on file   Occupational History    Not on file   Tobacco Use    Smoking status: Never     Passive exposure: Never    Smokeless tobacco: Never   Vaping Use    Vaping Use: Never used   Substance and Sexual Activity    Alcohol use: Not on file    Drug use: Not on file    Sexual activity: Not on file   Other Topics Concern    Not on file   Social History Narrative    Not on file     Social Determinants of Health     Financial Resource Strain: Low Risk  (1/5/2024)    Financial Resource Strain     Within the past 12 months, have you or your family members you live with been unable to get utilities (heat, electricity) when it was really needed?: No   Food Insecurity: Low Risk  (1/5/2024)    Food Insecurity     Within the past 12 months, did you worry that your food would run out before you got money to buy more?: No     Within the past 12 months, did the food you bought just not last  and you didn t have money to get more?: No   Transportation Needs: Low Risk  (1/5/2024)    Transportation Needs     Within the past 12 months, has lack of transportation kept you from medical appointments, getting your medicines, non-medical meetings or appointments, work, or from getting things that you need?: No   Physical Activity: Not on file   Stress: Not on file   Social Connections: Not on file   Interpersonal Safety: Low Risk  (1/5/2024)    Interpersonal Safety     Do you feel physically and emotionally safe where you currently live?: Yes     Within the past 12 months, have you been hit, slapped, kicked or otherwise physically hurt by someone?: No     Within the past 12 months, have you been humiliated or emotionally abused in other ways by your partner or ex-partner?: No   Housing Stability: Low Risk  (1/5/2024)    Housing Stability     Do you have housing? : Yes     Are you worried about losing your housing?: No       Current Outpatient Medications   Medication Sig Dispense Refill    busPIRone (BUSPAR) 15 MG tablet Take 1 tablet (15 mg) by mouth 2 times daily 180 tablet 1    LORazepam (ATIVAN) 1 MG tablet TAKE 1/2 TO 1 TABLET BY MOUTH DAILY AS NEEDED      PARoxetine (PAXIL) 40 MG tablet Take 1 tablet (40 mg) by mouth every morning 90 tablet 3    semaglutide (OZEMPIC) 2 MG/3ML pen Inject 0.5 mg Subcutaneous every 7 days 3 mL 1    Semaglutide, 1 MG/DOSE, (OZEMPIC) 4 MG/3ML pen Inject 1 mg Subcutaneous every 7 days 3 mL 4    Semaglutide-Weight Management (WEGOVY) 1 MG/0.5ML pen Inject 1 mg Subcutaneous once a week 2 mL 0       Above was reviewed  Physical exam: LMP 12/19/2023    Patient able to get up on table without difficulty.   Patient is alert and orientated.   Head eyes, nose and mouth within normal limits.        Has on the left upper back a 2 by 1 cm sebaceous cyst with the opening in the middle of it.   Skin was warm and pink  Normal Affect    Lower extremity edema is not present.    Labs show:            Component  Ref Range & Units 1 mo ago 4 mo ago 9 mo ago    Hemoglobin A1C  0.0 - 5.6 % 5.4 6.3 High  CM 6.1 High  CM   Comment: Normal <5.7%  Prediabetes 5.7-6.4%    Diabetes 6.5% or higher     Note: Adopted from ADA consensus guidelines.   Resulting              Assessment:   Has on the left upper back a 2 by 1 cm sebaceous cyst with the opening in the middle of it.    Plan to do patient is good with doing this in the office. .         Discussed that  We can remove the cyst now but will have to leave it open. And may have to pack it.  Since antibiotics are working will have her [place] it out and then give another 14 days worth to take 7 days BEFORE SURGERY AND THEN 7 DAYS AFTER SURGERY TO REMOVE IT.   If not getting better may have to just remove and leave open.  If comes back before than will have patient restart the antibiotics.   Plan to do set up a time in 2-3 weeks to excise the cyst.  .     Risks of surgery include damage to nerves, bleeding, infection, damage to  Vessels, recurrence.           Time spent with the patient with greater that 50% of the time in discussion was 30 minutes.  In discussing the plan.      Girish Bang MD

## 2024-01-31 ENCOUNTER — TELEPHONE (OUTPATIENT)
Dept: FAMILY MEDICINE | Facility: CLINIC | Age: 39
End: 2024-01-31
Payer: COMMERCIAL

## 2024-01-31 NOTE — TELEPHONE ENCOUNTER
Prior Authorization Retail Medication Request    Medication/Dose: Ozempic needs PA.   Diagnosis and ICD code (if different than what is on RX):  Same  New/renewal/insurance change PA/secondary ins. PA:  Previously Tried and Failed:  unknown  Rationale:  ozempic needs pa    Insurance   Primary: [facility] [employer]  Insurance ID:  [id number]        Pharmacy Information (if different than what is on RX)  Name:  Same  Phone:  Same  Fax:Same

## 2024-02-13 NOTE — TELEPHONE ENCOUNTER
PRIOR AUTHORIZATION DENIED    Medication: OZEMPIC (0.25 OR 0.5 MG/DOSE) 2 [facility]  Insurance Company: [employer] ([facility]) - Phone [phone] Fax [phone]  Denial Date: 2/13/2024  Denial Reason(s):       Appeal Information:         Patient Notified: NO

## 2024-02-13 NOTE — TELEPHONE ENCOUNTER
PA Initiation    Medication: OZEMPIC (0.25 OR 0.5 MG/DOSE) 2 MG/3ML SC SOPN  Insurance Company: [employer] ([facility]) - Phone [phone] Fax [phone]  Pharmacy Filling the Rx: [facility] - [place], [place] - [address].  Filling Pharmacy Phone: [phone]  Filling Pharmacy Fax: [phone]  Start Date: 2/13/2024          Note: Due to record-high volumes, our turn-around is time taking longer than normal . We are currently 10 days behind in the pools.   We are working diligently to submit all requests in a timely manner and in the order they are received. Please only flag true urgent requests as high priority to the pool at this time.   If you have questions - please send a note/message in the active PA encounter and send back to the RPPA (Retail Pharmacy Prior Authorization) team [[id number]].    If you have more specific questions about our process please reach out to our supervisor Adia Jalloh.   Thank you!

## 2024-02-15 NOTE — TELEPHONE ENCOUNTER
PA was denied back in Sept 2023. Seems like patient has been paying out of pocket for Wegovy per last office visit note 1/5/24.    Left detailed message for patient that PA was denied again for Ozempic. If patient has any other questions or concerns, reach out to clinic.    Sasha Smith MA

## 2024-02-15 NOTE — TELEPHONE ENCOUNTER
Patient Called back states that she would like for the doctor to submit an appeal for this.    [place] to ?

## 2024-02-15 NOTE — TELEPHONE ENCOUNTER
Please let the patient know that I know from experience that appeal would not be successful.    Blanquita Schwab DO

## 2024-02-26 ENCOUNTER — MYC MEDICAL ADVICE (OUTPATIENT)
Dept: FAMILY MEDICINE | Facility: CLINIC | Age: 39
End: 2024-02-26
Payer: COMMERCIAL

## 2024-02-26 DIAGNOSIS — E66.01 MORBID OBESITY (H): Primary | ICD-10-CM

## 2024-02-26 DIAGNOSIS — R73.03 PREDIABETES: ICD-10-CM

## 2024-02-27 NOTE — TELEPHONE ENCOUNTER
Routing mychart to PCP      Pt states insurance requesting another set of fasting labs and Aic for her ozempic rx.  Last set done was 12/15.  Pt informed needs to wait 3 months in between .    OK to order labs for 3/15 for patient- pended if agreeable.      Genny, RN    Triage Nurse  [facility]

## 2024-03-04 NOTE — TELEPHONE ENCOUNTER
Routing [place] message for provider.    Patient stating her insurance is asking for a 2 hour glucose tolerance test for wegovy approval.    Willing to order?    Christine M Klisch, RN

## 2024-03-05 NOTE — TELEPHONE ENCOUNTER
Routing to PCP      Please see attached form from insurance      Genny RN    Triage Nurse  [facility]

## 2024-03-06 NOTE — TELEPHONE ENCOUNTER
Please let the patient know because her A1c and fasting blood sugar do not qualify her neither what about 2-hour glucose tolerance test.  These are all similar for diagnosing diabetes.    Blanquita Schwab DO

## 2024-03-15 ENCOUNTER — LAB (OUTPATIENT)
Dept: LAB | Facility: CLINIC | Age: 39
End: 2024-03-15
Payer: COMMERCIAL

## 2024-03-15 ENCOUNTER — PATIENT OUTREACH (OUTPATIENT)
Dept: CARE COORDINATION | Facility: CLINIC | Age: 39
End: 2024-03-15

## 2024-03-15 DIAGNOSIS — R73.03 PREDIABETES: ICD-10-CM

## 2024-03-15 DIAGNOSIS — E66.01 MORBID OBESITY (H): ICD-10-CM

## 2024-03-15 LAB — HBA1C MFR BLD: 5.3 % (ref 0–5.6)

## 2024-03-15 PROCEDURE — 36415 COLL VENOUS BLD VENIPUNCTURE: CPT

## 2024-03-15 PROCEDURE — 83036 HEMOGLOBIN GLYCOSYLATED A1C: CPT

## 2024-03-15 PROCEDURE — 80048 BASIC METABOLIC PNL TOTAL CA: CPT

## 2024-03-16 LAB
ANION GAP SERPL CALCULATED.3IONS-SCNC: 11 MMOL/L (ref 7–15)
BUN SERPL-MCNC: 4.8 MG/DL (ref 6–20)
CALCIUM SERPL-MCNC: 9.2 MG/DL (ref 8.6–10)
CHLORIDE SERPL-SCNC: 103 MMOL/L (ref 98–107)
CREAT SERPL-MCNC: 0.69 MG/DL (ref 0.51–0.95)
DEPRECATED HCO3 PLAS-SCNC: 23 MMOL/L (ref 22–29)
EGFRCR SERPLBLD CKD-EPI 2021: >90 ML/MIN/1.73M2
GLUCOSE SERPL-MCNC: 114 MG/DL (ref 70–99)
POTASSIUM SERPL-SCNC: 4.1 MMOL/L (ref 3.4–5.3)
SODIUM SERPL-SCNC: 137 MMOL/L (ref 135–145)

## 2024-04-19 ASSESSMENT — ANXIETY QUESTIONNAIRES
7. FEELING AFRAID AS IF SOMETHING AWFUL MIGHT HAPPEN: SEVERAL DAYS
GAD7 TOTAL SCORE: 7
3. WORRYING TOO MUCH ABOUT DIFFERENT THINGS: MORE THAN HALF THE DAYS
2. NOT BEING ABLE TO STOP OR CONTROL WORRYING: SEVERAL DAYS
1. FEELING NERVOUS, ANXIOUS, OR ON EDGE: SEVERAL DAYS
5. BEING SO RESTLESS THAT IT IS HARD TO SIT STILL: NOT AT ALL
8. IF YOU CHECKED OFF ANY PROBLEMS, HOW DIFFICULT HAVE THESE MADE IT FOR YOU TO DO YOUR WORK, TAKE CARE OF THINGS AT HOME, OR GET ALONG WITH OTHER PEOPLE?: SOMEWHAT DIFFICULT
4. TROUBLE RELAXING: SEVERAL DAYS
7. FEELING AFRAID AS IF SOMETHING AWFUL MIGHT HAPPEN: SEVERAL DAYS
IF YOU CHECKED OFF ANY PROBLEMS ON THIS QUESTIONNAIRE, HOW DIFFICULT HAVE THESE PROBLEMS MADE IT FOR YOU TO DO YOUR WORK, TAKE CARE OF THINGS AT HOME, OR GET ALONG WITH OTHER PEOPLE: SOMEWHAT DIFFICULT
6. BECOMING EASILY ANNOYED OR IRRITABLE: SEVERAL DAYS

## 2024-04-26 ENCOUNTER — OFFICE VISIT (OUTPATIENT)
Dept: FAMILY MEDICINE | Facility: CLINIC | Age: 39
End: 2024-04-26
Payer: COMMERCIAL

## 2024-04-26 VITALS
SYSTOLIC BLOOD PRESSURE: 118 MMHG | TEMPERATURE: 98.5 F | DIASTOLIC BLOOD PRESSURE: 70 MMHG | HEIGHT: 66 IN | OXYGEN SATURATION: 100 % | WEIGHT: 197 LBS | RESPIRATION RATE: 12 BRPM | HEART RATE: 70 BPM | BODY MASS INDEX: 31.66 KG/M2

## 2024-04-26 DIAGNOSIS — E66.01 MORBID OBESITY (H): ICD-10-CM

## 2024-04-26 DIAGNOSIS — Z00.00 ROUTINE GENERAL MEDICAL EXAMINATION AT A HEALTH CARE FACILITY: Primary | ICD-10-CM

## 2024-04-26 DIAGNOSIS — F41.1 GAD (GENERALIZED ANXIETY DISORDER): ICD-10-CM

## 2024-04-26 DIAGNOSIS — D22.5 MELANOCYTIC NEVUS OF TRUNK: ICD-10-CM

## 2024-04-26 DIAGNOSIS — R73.03 PREDIABETES: ICD-10-CM

## 2024-04-26 DIAGNOSIS — Z13.220 LIPID SCREENING: ICD-10-CM

## 2024-04-26 LAB
ANION GAP SERPL CALCULATED.3IONS-SCNC: 11 MMOL/L (ref 7–15)
BUN SERPL-MCNC: 7.9 MG/DL (ref 6–20)
CALCIUM SERPL-MCNC: 9.4 MG/DL (ref 8.6–10)
CHLORIDE SERPL-SCNC: 105 MMOL/L (ref 98–107)
CHOLEST SERPL-MCNC: 101 MG/DL
CREAT SERPL-MCNC: 0.63 MG/DL (ref 0.51–0.95)
DEPRECATED HCO3 PLAS-SCNC: 24 MMOL/L (ref 22–29)
EGFRCR SERPLBLD CKD-EPI 2021: >90 ML/MIN/1.73M2
FASTING STATUS PATIENT QL REPORTED: YES
GLUCOSE SERPL-MCNC: 96 MG/DL (ref 70–99)
HDLC SERPL-MCNC: 51 MG/DL
LDLC SERPL CALC-MCNC: 34 MG/DL
NONHDLC SERPL-MCNC: 50 MG/DL
POTASSIUM SERPL-SCNC: 3.8 MMOL/L (ref 3.4–5.3)
SODIUM SERPL-SCNC: 140 MMOL/L (ref 135–145)
TRIGL SERPL-MCNC: 81 MG/DL

## 2024-04-26 PROCEDURE — 99395 PREV VISIT EST AGE 18-39: CPT | Mod: 25 | Performed by: FAMILY MEDICINE

## 2024-04-26 PROCEDURE — 96127 BRIEF EMOTIONAL/BEHAV ASSMT: CPT | Performed by: FAMILY MEDICINE

## 2024-04-26 PROCEDURE — 80061 LIPID PANEL: CPT | Performed by: FAMILY MEDICINE

## 2024-04-26 PROCEDURE — 36415 COLL VENOUS BLD VENIPUNCTURE: CPT | Performed by: FAMILY MEDICINE

## 2024-04-26 PROCEDURE — 99213 OFFICE O/P EST LOW 20 MIN: CPT | Mod: 25 | Performed by: FAMILY MEDICINE

## 2024-04-26 PROCEDURE — 90651 9VHPV VACCINE 2/3 DOSE IM: CPT | Performed by: FAMILY MEDICINE

## 2024-04-26 PROCEDURE — 90471 IMMUNIZATION ADMIN: CPT | Performed by: FAMILY MEDICINE

## 2024-04-26 PROCEDURE — 80048 BASIC METABOLIC PNL TOTAL CA: CPT | Performed by: FAMILY MEDICINE

## 2024-04-26 RX ORDER — BUSPIRONE HYDROCHLORIDE 15 MG/1
15 TABLET ORAL 2 TIMES DAILY
Qty: 180 TABLET | Refills: 1 | Status: SHIPPED | OUTPATIENT
Start: 2024-04-26

## 2024-04-26 RX ORDER — HYDROXYZINE HYDROCHLORIDE 10 MG/1
10 TABLET, FILM COATED ORAL 2 TIMES DAILY PRN
Qty: 5 TABLET | Refills: 2 | Status: SHIPPED | OUTPATIENT
Start: 2024-04-26

## 2024-04-26 SDOH — HEALTH STABILITY: PHYSICAL HEALTH: ON AVERAGE, HOW MANY DAYS PER WEEK DO YOU ENGAGE IN MODERATE TO STRENUOUS EXERCISE (LIKE A BRISK WALK)?: 5 DAYS

## 2024-04-26 ASSESSMENT — ANXIETY QUESTIONNAIRES
1. FEELING NERVOUS, ANXIOUS, OR ON EDGE: SEVERAL DAYS
IF YOU CHECKED OFF ANY PROBLEMS ON THIS QUESTIONNAIRE, HOW DIFFICULT HAVE THESE PROBLEMS MADE IT FOR YOU TO DO YOUR WORK, TAKE CARE OF THINGS AT HOME, OR GET ALONG WITH OTHER PEOPLE: SOMEWHAT DIFFICULT
8. IF YOU CHECKED OFF ANY PROBLEMS, HOW DIFFICULT HAVE THESE MADE IT FOR YOU TO DO YOUR WORK, TAKE CARE OF THINGS AT HOME, OR GET ALONG WITH OTHER PEOPLE?: SOMEWHAT DIFFICULT
2. NOT BEING ABLE TO STOP OR CONTROL WORRYING: SEVERAL DAYS
3. WORRYING TOO MUCH ABOUT DIFFERENT THINGS: SEVERAL DAYS
GAD7 TOTAL SCORE: 6
7. FEELING AFRAID AS IF SOMETHING AWFUL MIGHT HAPPEN: SEVERAL DAYS
4. TROUBLE RELAXING: NOT AT ALL
GAD7 TOTAL SCORE: 6
7. FEELING AFRAID AS IF SOMETHING AWFUL MIGHT HAPPEN: SEVERAL DAYS
6. BECOMING EASILY ANNOYED OR IRRITABLE: SEVERAL DAYS
5. BEING SO RESTLESS THAT IT IS HARD TO SIT STILL: SEVERAL DAYS
GAD7 TOTAL SCORE: 6

## 2024-04-26 ASSESSMENT — PATIENT HEALTH QUESTIONNAIRE - PHQ9
10. IF YOU CHECKED OFF ANY PROBLEMS, HOW DIFFICULT HAVE THESE PROBLEMS MADE IT FOR YOU TO DO YOUR WORK, TAKE CARE OF THINGS AT HOME, OR GET ALONG WITH OTHER PEOPLE: SOMEWHAT DIFFICULT
SUM OF ALL RESPONSES TO PHQ QUESTIONS 1-9: 7
SUM OF ALL RESPONSES TO PHQ QUESTIONS 1-9: 7

## 2024-04-26 ASSESSMENT — PAIN SCALES - GENERAL: PAINLEVEL: NO PAIN (0)

## 2024-04-26 ASSESSMENT — SOCIAL DETERMINANTS OF HEALTH (SDOH): HOW OFTEN DO YOU GET TOGETHER WITH FRIENDS OR RELATIVES?: ONCE A WEEK

## 2024-04-26 NOTE — PATIENT INSTRUCTIONS
Preventive Care Advice   This is general advice given by our system to help you stay healthy. However, your care team may have specific advice just for you. Please talk to your care team about your preventive care needs.  Nutrition  Eat 5 or more servings of fruits and vegetables each day.  Try wheat bread, brown rice and whole grain pasta (instead of white bread, rice, and pasta).  Get enough calcium and vitamin D. Check the label on foods and aim for 100% of the RDA (recommended daily allowance).  Lifestyle  Exercise at least 150 minutes each week   (30 minutes a day, 5 days a week).  Do muscle strengthening activities 2 days a week. These help control your weight and prevent disease.  No smoking.  Wear sunscreen to prevent skin cancer.  Have a dental exam and cleaning every 6 months.  Yearly exams  See your health care team every year to talk about:  Any changes in your health.  Any medicines your care team has prescribed.  Preventive care, family planning, and ways to prevent chronic diseases.  Shots (vaccines)   HPV shots (up to age 26), if you've never had them before.  Hepatitis B shots (up to age 59), if you've never had them before.  COVID-19 shot: Get this shot when it's due.  Flu shot: Get a flu shot every year.  Tetanus shot: Get a tetanus shot every 10 years.  Pneumococcal, hepatitis A, and RSV shots: Ask your care team if you need these based on your risk.  Shingles shot (for age 50 and up).  General health tests  Diabetes screening:  Starting at age 35, Get screened for diabetes at least every 3 years.  If you are younger than age 35, ask your care team if you should be screened for diabetes.  Cholesterol test: At age 39, start having a cholesterol test every 5 years, or more often if advised.  Bone density scan (DEXA): At age 50, ask your care team if you should have this scan for osteoporosis (brittle bones).  Hepatitis C: Get tested at least once in your life.  STIs (sexually transmitted  infections)  Before age 24: Ask your care team if you should be screened for STIs.  After age 24: Get screened for STIs if you're at risk. You are at risk for STIs (including HIV) if:  You are sexually active with more than one person.  You don't use condoms every time.  You or a partner was diagnosed with a sexually transmitted infection.  If you are at risk for HIV, ask about PrEP medicine to prevent HIV.  Get tested for HIV at least once in your life, whether you are at risk for HIV or not.  Cancer screening tests  Cervical cancer screening: If you have a cervix, begin getting regular cervical cancer screening tests at age 21. Most people who have regular screenings with normal results can stop after age 65. Talk about this with your provider.  Breast cancer scan (mammogram): If you've ever had breasts, begin having regular mammograms starting at age 40. This is a scan to check for breast cancer.  Colon cancer screening: It is important to start screening for colon cancer at age 45.  Have a colonoscopy test every 10 years (or more often if you're at risk) Or, ask your provider about stool tests like a FIT test every year or Cologuard test every 3 years.  To learn more about your testing options, visit: https://www.[employer]/267167.pdf.  For help making a decision, visit: https://bit.ly/nw15332.  Prostate cancer screening test: If you have a prostate and are age 55 to 69, ask your provider if you would benefit from a yearly prostate cancer screening test.  Lung cancer screening: If you are a current or former smoker age 50 to 80, ask your care team if ongoing lung cancer screenings are right for you.  For informational purposes only. Not to replace the advice of your health care provider. Copyright   2023 [employer]. All rights reserved. Clinically reviewed by the [facility]. Yelp 241553 - REV 01/24.    Learning About Stress  What is stress?     Stress is your  body's response to a hard situation. Your body can have a physical, emotional, or mental response. Stress is a fact of life for most people, and it affects everyone differently. What causes stress for you may not be stressful for someone else.  A lot of things can cause stress. You may feel stress when you go on a job interview, take a test, or run a race. This kind of short-term stress is normal and even useful. It can help you if you need to work hard or react quickly. For example, stress can help you finish an important job on time.  Long-term stress is caused by ongoing stressful situations or events. Examples of long-term stress include long-term health problems, ongoing problems at work, or conflicts in your family. Long-term stress can harm your health.  How does stress affect your health?  When you are stressed, your body responds as though you are in danger. It makes hormones that speed up your heart, make you breathe faster, and give you a burst of energy. This is called the fight-or-flight stress response. If the stress is over quickly, your body goes back to normal and no harm is done.  But if stress happens too often or lasts too long, it can have bad effects. Long-term stress can make you more likely to get sick, and it can make symptoms of some diseases worse. If you tense up when you are stressed, you may develop neck, shoulder, or low back pain. Stress is linked to high blood pressure and heart disease.  Stress also harms your emotional health. It can make you de la garza, tense, or depressed. Your relationships may suffer, and you may not do well at work or school.  What can you do to manage stress?  You can try these things to help manage stress:   Do something active. Exercise or activity can help reduce stress. Walking is a great way to get started. Even everyday activities such as housecleaning or yard work can help.  Try yoga or vadim chi. These techniques combine exercise and meditation. You may need  some training at first to learn them.  Do something you enjoy. For example, listen to music or go to a movie. Practice your hobby or do volunteer work.  Meditate. This can help you relax, because you are not worrying about what happened before or what may happen in the future.  Do guided imagery. Imagine yourself in any setting that helps you feel calm. You can use online videos, books, or a teacher to guide you.  Do breathing exercises. For example:  From a standing position, bend forward from the waist with your knees slightly bent. Let your arms dangle close to the floor.  Breathe in slowly and deeply as you return to a standing position. Roll up slowly and lift your head last.  Hold your breath for just a few seconds in the standing position.  Breathe out slowly and bend forward from the waist.  Let your feelings out. Talk, laugh, cry, and express anger when you need to. Talking with supportive friends or family, a counselor, or a joan leader about your feelings is a healthy way to relieve stress. Avoid discussing your feelings with people who make you feel worse.  Write. It may help to write about things that are bothering you. This helps you find out how much stress you feel and what is causing it. When you know this, you can find better ways to cope.  What can you do to prevent stress?  You might try some of these things to help prevent stress:  Manage your time. This helps you find time to do the things you want and need to do.  Get enough sleep. Your body recovers from the stresses of the day while you are sleeping.  Get support. Your family, friends, and community can make a difference in how you experience stress.  Limit your news feed. Avoid or limit time on social media or news that may make you feel stressed.  Do something active. Exercise or activity can help reduce stress. Walking is a great way to get started.  Where can you learn more?  Go to https://www.healthwise.net/patiented  Enter N032 in the  "search box to learn more about \"Learning About Stress.\"  Current as of: October 24, 2023               Content Version: 14.0    3697-6100 [employer].   Care instructions adapted under license by your healthcare professional. If you have questions about a medical condition or this instruction, always ask your healthcare professional. [employer] disclaims any warranty or liability for your use of this information.      Learning About Depression Screening  What is depression screening?  Depression screening is a way to see if you have depression symptoms. It may be done by a doctor or counselor. It's often part of a routine checkup. That's because your mental health is just as important as your physical health.  Depression is a mental health condition that affects how you feel, think, and act. You may:  Have less energy.  Lose interest in your daily activities.  Feel sad and grouchy for a long time.  Depression is very common. It affects people of all ages.  Many things can lead to depression. Some people become depressed after they have a stroke or find out they have a major illness like cancer or heart disease. The death of a loved one or a breakup may lead to depression. It can run in families. Most experts believe that a combination of inherited genes and stressful life events can cause it.  What happens during screening?  You may be asked to fill out a form about your depression symptoms. You and the doctor will discuss your answers. The doctor may ask you more questions to learn more about how you think, act, and feel.  What happens after screening?  If you have symptoms of depression, your doctor will talk to you about your options.  Doctors usually treat depression with medicines or counseling. Often, combining the two works best. Many people don't get help because they think that they'll get over the depression on their own. But people with depression may not get better unless they " "get treatment.  The cause of depression is not well understood. There may be many factors involved. But if you have depression, it's not your fault.  A serious symptom of depression is thinking about death or suicide. If you or someone you care about talks about this or about feeling hopeless, get help right away.  It's important to know that depression can be treated. Medicine, counseling, and self-care may help.  Where can you learn more?  Go to https://www.[employer].net/patiented  Enter T185 in the search box to learn more about \"Learning About Depression Screening.\"  Current as of: June 24, 2023               Content Version: 14.0    0734-6249 [employer].   Care instructions adapted under license by your healthcare professional. If you have questions about a medical condition or this instruction, always ask your healthcare professional. [employer] disclaims any warranty or liability for your use of this information.      "

## 2024-04-26 NOTE — PROGRESS NOTES
"Preventive Care Visit  [facility]  Blanquita DO Josselin, Family Medicine  Apr 26, 2024      Assessment & Plan     Routine general medical examination at a health care facility      Prediabetes    The patient's prediabetes has improved significantly on Ozempic.  However her insurance has not been paying for it and she has needed to pay for it out-of-pocket at considerable expense.  She would like to see if zepbound is any less expensive so I have called this and had an equivalent dose.  Have also refilled her semaglutide she should follow-up in about 6 months to recheck labs.    - tirzepatide-Weight Management (ZEPBOUND) 5 MG/0.5ML prefilled pen; Inject 0.5 mLs (5 mg) Subcutaneous every 7 days  - Semaglutide, 1 MG/DOSE, (OZEMPIC) 4 MG/3ML pen; Inject 1 mg Subcutaneous every 7 days  - Basic metabolic panel  (Ca, Cl, CO2, Creat, Gluc, K, Na, BUN); Future  - Basic metabolic panel  (Ca, Cl, CO2, Creat, Gluc, K, Na, BUN)    Morbid obesity (H)  Patient has lost about 40 pounds on Ozempic  - tirzepatide-Weight Management (ZEPBOUND) 5 MG/0.5ML prefilled pen; Inject 0.5 mLs (5 mg) Subcutaneous every 7 days  - Semaglutide, 1 MG/DOSE, (OZEMPIC) 4 MG/3ML pen; Inject 1 mg Subcutaneous every 7 days    ADELINA (generalized anxiety disorder)  Anxiety is controlled with Paxil and BuSpar.  She has occasionally taken Ativan in the past.  Because of its questionable safety I have encouraged her to try hydroxyzine instead for acute anxiety or panic  - hydrOXYzine HCl (ATARAX) 10 MG tablet; Take 1 tablet (10 mg) by mouth 2 times daily as needed for itching  - busPIRone (BUSPAR) 15 MG tablet; Take 1 tablet (15 mg) by mouth 2 times daily    Lipid screening    - Lipid panel reflex to direct LDL Fasting; Future  - Lipid panel reflex to direct LDL Fasting              BMI  Estimated body mass index is 31.8 kg/m  as calculated from the following:    Height as of this encounter: 1.676 m (5' 6\").    Weight as of this encounter: " 89.4 kg (197 lb).   Weight management plan: Discussed healthy diet and exercise guidelines    Counseling  Appropriate preventive services were discussed with this patient, including applicable screening as appropriate for fall prevention, nutrition, physical activity, Tobacco-use cessation, weight loss and cognition.  Checklist reviewing preventive services available has been given to the patient.  Reviewed patient's diet, addressing concerns and/or questions.   The patient's PHQ-9 score is consistent with mild depression. She was provided with information regarding depression.       Follow-up in 6 months on prediabetes status and weight    Subjective   Malou is a 38 year old, presenting for the following:  Physical        4/26/2024     6:56 AM   Additional Questions   Roomed by Sasha GONZALEZ   Accompanied by self         4/26/2024     6:56 AM   Patient Reported Additional Medications   Patient reports taking the following new medications none        Health Care Directive  Patient does not have a Health Care Directive or Living Will: Discussed advance care planning with patient; however, patient declined at this time.    History of Present Illness       Mental Health Follow-up:  Patient presents to follow-up on Depression & Anxiety.Patient's depression since last visit has been:  Good  The patient is having other symptoms associated with depression.  Patient's anxiety since last visit has been:  Good  The patient is having other symptoms associated with anxiety.  Any significant life events: health concerns  Patient is feeling anxious or having panic attacks.  Patient has no concerns about alcohol or drug use.    Diabetes:   She presents for follow up of diabetes.    She is not checking blood glucose.        She is concerned about other.   She is having weight loss.            She eats 2-3 servings of fruits and vegetables daily.She consumes 0 sweetened beverage(s) daily.She exercises with enough effort to increase her  heart rate 10 to 19 minutes per day.  She exercises with enough effort to increase her heart rate 5 days per week.   She is taking medications regularly.    Social History     Tobacco Use    Smoking status: Never     Passive exposure: Never    Smokeless tobacco: Never   Vaping Use    Vaping status: Never Used         9/13/2023    10:14 AM 1/5/2024     6:48 AM 4/26/2024     6:52 AM   PHQ   PHQ-9 Total Score 8 6 7   Q9: Thoughts of better off dead/self-harm past 2 weeks Not at all Not at all Not at all         1/5/2024     6:48 AM 4/19/2024    10:20 AM 4/26/2024     6:52 AM   ADELINA-7 SCORE   Total Score 6 (mild anxiety) 7 (mild anxiety) 6 (mild anxiety)   Total Score 6 7 6         4/26/2024     6:52 AM   Last PHQ-9   1.  Little interest or pleasure in doing things 1   2.  Feeling down, depressed, or hopeless 1   3.  Trouble falling or staying asleep, or sleeping too much 2   4.  Feeling tired or having little energy 1   5.  Poor appetite or overeating 0   6.  Feeling bad about yourself 1   7.  Trouble concentrating 1   8.  Moving slowly or restless 0   Q9: Thoughts of better off dead/self-harm past 2 weeks 0   PHQ-9 Total Score 7         4/26/2024     6:52 AM   ADELINA-7    1. Feeling nervous, anxious, or on edge 1   2. Not being able to stop or control worrying 1   3. Worrying too much about different things 1   4. Trouble relaxing 0   5. Being so restless that it is hard to sit still 1   6. Becoming easily annoyed or irritable 1   7. Feeling afraid, as if something awful might happen 1   ADELINA-7 Total Score 6   If you checked any problems, how difficult have they made it for you to do your work, take care of things at home, or get along with other people? Somewhat difficult       Suicide Assessment Five-step Evaluation and Treatment (SAFE-T)              4/26/2024   General Health   How would you rate your overall physical health? (!) FAIR   Feel stress (tense, anxious, or unable to sleep) To some extent   (!) STRESS  CONCERN      4/26/2024   Nutrition   Three or more servings of calcium each day? Yes   Diet: Diabetic   How many servings of fruit and vegetables per day? (!) 2-3   How many sweetened beverages each day? 0-1         4/26/2024   Exercise   Days per week of moderate/strenous exercise 5 days         4/26/2024   Social Factors   Frequency of gathering with friends or relatives Once a week   Worry food won't last until get money to buy more No   Food not last or not have enough money for food? No   Do you have housing?  Yes   Are you worried about losing your housing? No   Lack of transportation? No   Unable to get utilities (heat,electricity)? No         4/26/2024   Dental   Dentist two times every year? Yes         4/26/2024   TB Screening   Were you born outside of the [place]? No       Today's PHQ-9 Score:       4/26/2024     6:52 AM   PHQ-9 SCORE   PHQ-9 Total Score MyChart 7 (Mild depression)   PHQ-9 Total Score 7         4/26/2024   Substance Use   Alcohol more than 3/day or more than 7/wk No   Do you use any other substances recreationally? (!) ALCOHOL     Social History     Tobacco Use    Smoking status: Never     Passive exposure: Never    Smokeless tobacco: Never   Vaping Use    Vaping status: Never Used           4/14/2023   LAST FHS-7 RESULTS   1st degree relative breast or ovarian cancer No   Any relative bilateral breast cancer No   Any male have breast cancer No   Any ONE woman have BOTH breast AND ovarian cancer No   Any woman with breast cancer before 50yrs No   2 or more relatives with breast AND/OR ovarian cancer No   2 or more relatives with breast AND/OR bowel cancer No        Mammogram Screening - Patient under 40 years of age: Routine Mammogram Screening not recommended.         4/26/2024   STI Screening   New sexual partner(s) since last STI/HIV test? No     History of abnormal Pap smear: NO - age 30-65 PAP every 5 years with negative HPV co-testing recommended        7/12/2021     8:28 AM   PAP / HPV  "  PAP-ABSTRACT See Scanned Document           This result is from an external source.           4/26/2024   Contraception/Family Planning   Questions about contraception or family planning No        Reviewed and updated as needed this visit by Provider                          Review of Systems  Constitutional, HEENT, cardiovascular, pulmonary, gi and gu systems are negative, except as otherwise noted.     Objective    Exam  /70 (BP Location: Right arm, Patient Position: Sitting, Cuff Size: Adult Regular)   Pulse 70   Temp 98.5  F (36.9  C) (Oral)   Resp 12   Ht 1.676 m (5' 6\")   Wt 89.4 kg (197 lb)   LMP 03/25/2024   SpO2 100%   BMI 31.80 kg/m     Estimated body mass index is 31.8 kg/m  as calculated from the following:    Height as of this encounter: 1.676 m (5' 6\").    Weight as of this encounter: 89.4 kg (197 lb).    Physical Exam  GENERAL: alert and no distress  EYES: Eyes grossly normal to inspection, PERRL and conjunctivae and sclerae normal  HENT: ear canals and TM's normal, nose and mouth without ulcers or lesions  NECK: no adenopathy, no asymmetry, masses, or scars  RESP: lungs clear to auscultation - no rales, rhonchi or wheezes  BREAST: normal without masses, tenderness or nipple discharge and no palpable axillary masses or adenopathy  CV: regular rate and rhythm, normal S1 S2, no S3 or S4, no murmur, click or rub, no peripheral edema  ABDOMEN: soft, nontender, no hepatosplenomegaly, no masses and bowel sounds normal  MS: no gross musculoskeletal defects noted, no edema  SKIN: no suspicious lesions or rashes numerous irregular moles > 3 mm in size over trunk and extremities     NEURO: Normal strength and tone, mentation intact and speech normal  PSYCH: mentation appears normal, affect normal/bright        Signed Electronically by: Blanquita Schwab DO    "

## 2024-04-27 ASSESSMENT — ANXIETY QUESTIONNAIRES: GAD7 TOTAL SCORE: 6

## 2024-04-29 ENCOUNTER — TELEPHONE (OUTPATIENT)
Dept: FAMILY MEDICINE | Facility: CLINIC | Age: 39
End: 2024-04-29
Payer: COMMERCIAL

## 2024-04-29 NOTE — TELEPHONE ENCOUNTER
Prior Authorization Retail Medication Request    Medication/Dose: Zepbound needs pa.   Diagnosis and ICD code (if different than what is on RX):  same  New/renewal/insurance change PA/secondary ins. PA:  Previously Tried and Failed:  unknown  Rationale:  zepbound needs pa    Insurance   Primary: [facility] commerical  Insurance ID:  [id number]    Secondary (if applicable):  Insurance ID:      Pharmacy Information (if different than what is on RX)  Name:  same  Phone:  same  Fax:same

## 2024-05-13 NOTE — TELEPHONE ENCOUNTER
Noted.  Please notify the patient that her medication is not covered by her insurance.    Blanquita Schwab DO

## 2024-05-13 NOTE — TELEPHONE ENCOUNTER
Note: Due to record-high volumes, our turn-around time is taking longer than usual . We are currently 10 business days behind in the pools.   We are working diligently to submit all requests in a timely manner and in the order they are received. Please only flag TRUE URGENT requests as high priority to the pool at this time.   If you have questions - please send a note/message in the active PA encounter and send back to the [facility] PA pool [[id number]].    If you have more specific questions about our process please reach out to our supervisor Adia Jalloh.   Thank you!   RPPA (Retail Pharmacy Prior Authorization) team    We are currently submitting requests we received on 04/29/2024    PRIOR AUTHORIZATION DENIED    Medication: ZEPBOUND 5 MG/0.5ML SC SOAJ  Insurance Company: [employer] ([facility]) - Phone [phone] Fax [phone]  Denial Date: 5/13/2024  Denial Rational:         Appeal Information: Drug exclusions can not be appealed. This medication will not be covered by the prescription plan for any reason. The drug is not on formulary and there are no loopholes to gaining approval.     Patient Notified: No

## 2024-05-13 NOTE — TELEPHONE ENCOUNTER
Retail Pharmacy Prior Authorization Team   Phone: [phone]    PA Initiation    Medication: ZEPBOUND 5 MG/0.5ML SC SOAJ  Insurance Company: [employer] ([facility]) - Phone [phone] Fax [phone]  Pharmacy Filling the Rx: [place], [place] - [address] [place] [address].  Filling Pharmacy Phone: [phone]  Filling Pharmacy Fax:    Start Date: 5/13/2024

## 2024-09-15 ENCOUNTER — MYC MEDICAL ADVICE (OUTPATIENT)
Dept: FAMILY MEDICINE | Facility: CLINIC | Age: 39
End: 2024-09-15
Payer: COMMERCIAL

## 2024-09-16 ENCOUNTER — VIRTUAL VISIT (OUTPATIENT)
Dept: FAMILY MEDICINE | Facility: CLINIC | Age: 39
End: 2024-09-16
Payer: COMMERCIAL

## 2024-09-16 DIAGNOSIS — U07.1 INFECTION DUE TO 2019 NOVEL CORONAVIRUS: Primary | ICD-10-CM

## 2024-09-16 PROCEDURE — 99213 OFFICE O/P EST LOW 20 MIN: CPT | Mod: 95 | Performed by: PHYSICIAN ASSISTANT

## 2024-09-16 NOTE — PATIENT INSTRUCTIONS
Coronavirus (COVID-19): Care Instructions  What is COVID-19?  COVID-19 is a disease caused by a type of coronavirus. This illness was first found in 2019 and has since spread worldwide (pandemic). Symptoms can range from mild, such as fever and body aches, to severe, including trouble breathing. COVID-19 can be deadly.  Coronaviruses are a large group of viruses. Some types cause the common cold. Others cause more serious illnesses like Middle East respiratory syndrome (MERS) and severe acute respiratory syndrome (SARS).  Follow-up care is a key part of your treatment and safety. Be sure to make and go to all appointments, and call your doctor if you are having problems. It's also a good idea to know your test results and keep a list of the medicines you take.  How can you self-isolate when you have COVID-19?  If you have COVID-19, there are things you can do to help avoid spreading the virus to others.  Stay home, and avoid contact with other people.  Limit contact with people in your home. If possible, stay in a separate bedroom and use a separate bathroom.  Wear a high-quality mask when you are around other people.  Improve airflow. If you have to spend time indoors with others, open windows and doors. Or you can use a fan to blow air away from people and out a window.  Avoid contact with pets and other animals.  Cover your mouth and nose with a tissue when you cough or sneeze. Then throw it in the trash right away.  Wash your hands often, especially after you cough or sneeze. Use soap and water, and scrub for at least 20 seconds. If soap and water aren't available, use an alcohol-based hand .  Don't share personal household items. These include bedding, towels, cups and glasses, and eating utensils.  Wash laundry in the warmest water allowed for the fabric type, and dry it completely. It's okay to wash other people's laundry with yours.  Clean and disinfect your home. Use household  and  disinfectant wipes or sprays.  Go to the CDC website at cdc.gov if you have questions.  When can you end self-isolation for COVID-19?  If you know or think that you have the virus, you will need to self-isolate. When you can be around other people you live with and leave home depends on whether you have symptoms. Important: Day 0 is the day your symptoms started or the day you tested positive. Day 1 is the day after your symptoms first started or your test was positive.  If you tested positive but had no symptoms, it's safe to end isolation at the end of Day 5. But if you start to have symptoms, follow the recommendations below, and count your first day of symptoms as Day 0.  If you have symptoms, when you can end isolation depends on how sick you were and your overall health. No matter what, you need to wait until your symptoms are getting better and you haven't had a fever for 24 hours while not taking medicines to lower the fever. Here's how long to isolate, based on your symptoms:  If you were only a little sick: (This means you might have felt really bad but had no shortness of breath and never needed to be in the hospital.) You can end isolation at the end of Day 5.  If you were more sick: (You had some shortness of breath or some trouble breathing but never needed to be in the hospital.) You can end isolation at the end of Day 10.  If you were very sick and needed to be in the hospital, or if you have a weakened immune system: You can end isolation at the end of Day 10 or later. Talk to your doctor to find out when it's safe to end isolation. You may need a viral test.  After you end isolation, if your symptoms come back or get worse: Restart your isolation at Day 0. Do this even if it happens after you took medicine for COVID.  Avoid travel and stay away from people at high risk for serious disease for at least 10 days.  Those who can't wear a mask because they are under 2 years old or have certain  "disabilities should isolate for at least 10 full days.  Call your doctor or seek care if you have questions about your symptoms or when to end isolation.  Go to the CDC website at cdc.gov if you have questions.  Where can you learn more?  Go to https://www.[employer].net/patiented  Enter C007 in the search box to learn more about \"Coronavirus (COVID-19): Care Instructions.\"  Current as of: May 28, 2024  Content Version: 14.1 2006-2024 [employer].   Care instructions adapted under license by your healthcare professional. If you have questions about a medical condition or this instruction, always ask your healthcare professional. [employer] disclaims any warranty or liability for your use of this information.    "

## 2024-09-16 NOTE — TELEPHONE ENCOUNTER
Called and spoke with patient.    She is interested in paxlovid but will require a provider visit as she is on buspirone.    Scheduled for visit today with provider.    Christine M Klisch, RN

## 2024-09-16 NOTE — PROGRESS NOTES
Malou is a 39 year old who is being evaluated via a billable video visit.    How would you like to obtain your AVS? MyChart  If the video visit is dropped, the invitation should be resent by: Text to cell phone: [phone]  Will anyone else be joining your video visit? No      Assessment & Plan     Infection due to 2019 novel coronavirus  Discussed treatment of COVID with antiviral therapy. We discussed benefits, side effects, and risk of medication. After discussion patient would like to start treatment. I am okay with this plan. Advised to HOLD Buspar while taking antiviral medication and for an additional 3 days after she complete the antiviral treatment. In addition I recommend conservative treatment. Quarantine guidelines discussed.  Warning signs to go to ER educated to patient. Follow up in clinic as needed. Patient agree's with this plan and has no further questions  - nirmatrelvir and ritonavir (PAXLOVID) 300 mg/100 mg therapy pack; Take 3 tablets by mouth 2 times daily for 5 days. (Take 2 Nirmatrelvir tablets and 1 Ritonavir tablet twice daily for 5 days)                Subjective   Malou is a 39 year old, presenting for the following health issues:  covid  HPI     COVID-19 Symptom Review  How many days ago did these symptoms start?   Sx started Saturday 09/14  Tested positive for covid 09/15  Exposure:  partner has been sick.  She tested positive Davon.  09/10    Are any of the following symptoms significant for you?  New or worsening difficulty breathing? yes  Worsening cough? Yes, it's a dry cough. Some phlegm  Fever or chills? Yes, I felt feverish or had chills. Temp at home has been normal   Headache: YES  Sore throat: YES  Chest pain: No  Diarrhea: No  Body aches? YES  Sinus pressure   What treatments has patient tried?  tylenol, robitussin at night.   Does patient live in a nursing home, group home, or shelter? No  Does patient have a way to get food/medications during quarantined? Yes, I have a  friend or family member who can help me.                Review of Systems  Constitutional, HEENT, cardiovascular, pulmonary, gi and gu systems are negative, except as otherwise noted.      Objective           Vitals:  No vitals were obtained today due to virtual visit.    Physical Exam   GENERAL: alert and no distress  EYES: Eyes grossly normal to inspection.  No discharge or erythema, or obvious scleral/conjunctival abnormalities.  RESP: No audible wheeze, cough, or visible cyanosis.    SKIN: Visible skin clear. No significant rash, abnormal pigmentation or lesions.  NEURO: Cranial nerves grossly intact.  Mentation and speech appropriate for age.  PSYCH: Appropriate affect, tone, and pace of words          Video-Visit Details    Type of service:  Video Visit   Originating Location (pt. Location): Home    Distant Location (provider location):  Off-site  Platform used for Video Visit: Neel  Signed Electronically by: CORBIN Winkler

## 2025-01-14 DIAGNOSIS — F33.1 MODERATE EPISODE OF RECURRENT MAJOR DEPRESSIVE DISORDER (H): ICD-10-CM

## 2025-01-14 RX ORDER — PAROXETINE 40 MG/1
40 TABLET, FILM COATED ORAL EVERY MORNING
Qty: 90 TABLET | Refills: 0 | Status: SHIPPED | OUTPATIENT
Start: 2025-01-14

## 2025-03-11 ENCOUNTER — TELEPHONE (OUTPATIENT)
Dept: FAMILY MEDICINE | Facility: CLINIC | Age: 40
End: 2025-03-11
Payer: COMMERCIAL

## 2025-03-11 NOTE — TELEPHONE ENCOUNTER
General Call    Contacts       Contact Date/Time Type Contact Phone/Fax    03/11/2025 12:25 PM CDT Phone (Incoming) Gabriella MUNOZ (Other) [phone]          Reason for Call:  Gabriella MUNOZ calling from [facility] dermatology. received an email with a zip folder from [employer].   email says AVS for pt. unable to open zip file/unable to access. would need to have this before appt with pt tomorrow morning at [facility]     What are your questions or concerns:  NA    Please send another AVS to the email listed below.   [email or web address]  [phone] (centralized number and can also fax to number)      Date of last appointment with provider: NA    Could we send this information to you in [employer] or would you prefer to receive a phone call?:   Patient would prefer a phone call   Okay to leave a detailed message?: N/A at Other phone number:  [facility] [phone]

## 2025-04-07 DIAGNOSIS — F33.1 MODERATE EPISODE OF RECURRENT MAJOR DEPRESSIVE DISORDER (H): ICD-10-CM

## 2025-04-07 RX ORDER — PAROXETINE 40 MG/1
40 TABLET, FILM COATED ORAL EVERY MORNING
Qty: 30 TABLET | Refills: 0 | Status: SHIPPED | OUTPATIENT
Start: 2025-04-07

## 2025-04-22 ENCOUNTER — VIRTUAL VISIT (OUTPATIENT)
Dept: FAMILY MEDICINE | Facility: CLINIC | Age: 40
End: 2025-04-22
Payer: COMMERCIAL

## 2025-04-22 DIAGNOSIS — Z13.220 LIPID SCREENING: Primary | ICD-10-CM

## 2025-04-22 DIAGNOSIS — R55 VASOVAGAL SYNCOPE: ICD-10-CM

## 2025-04-22 PROCEDURE — 98014 SYNCH AUDIO-ONLY EST MOD 30: CPT | Performed by: FAMILY MEDICINE

## 2025-04-22 ASSESSMENT — PATIENT HEALTH QUESTIONNAIRE - PHQ9
10. IF YOU CHECKED OFF ANY PROBLEMS, HOW DIFFICULT HAVE THESE PROBLEMS MADE IT FOR YOU TO DO YOUR WORK, TAKE CARE OF THINGS AT HOME, OR GET ALONG WITH OTHER PEOPLE: SOMEWHAT DIFFICULT
SUM OF ALL RESPONSES TO PHQ QUESTIONS 1-9: 9
SUM OF ALL RESPONSES TO PHQ QUESTIONS 1-9: 9

## 2025-04-22 ASSESSMENT — ANXIETY QUESTIONNAIRES
1. FEELING NERVOUS, ANXIOUS, OR ON EDGE: NEARLY EVERY DAY
GAD7 TOTAL SCORE: 13
GAD7 TOTAL SCORE: 13
2. NOT BEING ABLE TO STOP OR CONTROL WORRYING: NEARLY EVERY DAY
5. BEING SO RESTLESS THAT IT IS HARD TO SIT STILL: SEVERAL DAYS
8. IF YOU CHECKED OFF ANY PROBLEMS, HOW DIFFICULT HAVE THESE MADE IT FOR YOU TO DO YOUR WORK, TAKE CARE OF THINGS AT HOME, OR GET ALONG WITH OTHER PEOPLE?: VERY DIFFICULT
3. WORRYING TOO MUCH ABOUT DIFFERENT THINGS: MORE THAN HALF THE DAYS
IF YOU CHECKED OFF ANY PROBLEMS ON THIS QUESTIONNAIRE, HOW DIFFICULT HAVE THESE PROBLEMS MADE IT FOR YOU TO DO YOUR WORK, TAKE CARE OF THINGS AT HOME, OR GET ALONG WITH OTHER PEOPLE: VERY DIFFICULT
4. TROUBLE RELAXING: SEVERAL DAYS
8. IF YOU CHECKED OFF ANY PROBLEMS, HOW DIFFICULT HAVE THESE MADE IT FOR YOU TO DO YOUR WORK, TAKE CARE OF THINGS AT HOME, OR GET ALONG WITH OTHER PEOPLE?: VERY DIFFICULT
GAD7 TOTAL SCORE: 13
GAD7 TOTAL SCORE: 13
7. FEELING AFRAID AS IF SOMETHING AWFUL MIGHT HAPPEN: MORE THAN HALF THE DAYS
7. FEELING AFRAID AS IF SOMETHING AWFUL MIGHT HAPPEN: MORE THAN HALF THE DAYS
GAD7 TOTAL SCORE: 13
6. BECOMING EASILY ANNOYED OR IRRITABLE: SEVERAL DAYS
4. TROUBLE RELAXING: SEVERAL DAYS
2. NOT BEING ABLE TO STOP OR CONTROL WORRYING: NEARLY EVERY DAY
7. FEELING AFRAID AS IF SOMETHING AWFUL MIGHT HAPPEN: MORE THAN HALF THE DAYS
1. FEELING NERVOUS, ANXIOUS, OR ON EDGE: NEARLY EVERY DAY
6. BECOMING EASILY ANNOYED OR IRRITABLE: SEVERAL DAYS
GAD7 TOTAL SCORE: 13
3. WORRYING TOO MUCH ABOUT DIFFERENT THINGS: MORE THAN HALF THE DAYS
7. FEELING AFRAID AS IF SOMETHING AWFUL MIGHT HAPPEN: MORE THAN HALF THE DAYS
IF YOU CHECKED OFF ANY PROBLEMS ON THIS QUESTIONNAIRE, HOW DIFFICULT HAVE THESE PROBLEMS MADE IT FOR YOU TO DO YOUR WORK, TAKE CARE OF THINGS AT HOME, OR GET ALONG WITH OTHER PEOPLE: VERY DIFFICULT
5. BEING SO RESTLESS THAT IT IS HARD TO SIT STILL: SEVERAL DAYS

## 2025-04-22 ASSESSMENT — ENCOUNTER SYMPTOMS: SYNCOPE: 1

## 2025-04-22 NOTE — PROGRESS NOTES
"Malou is a 39 year old who is being evaluated via a billable telephone visit.    What phone number would you like to be contacted at? [phone]  How would you like to obtain your AVS? Komal  Originating Location (pt. Location): Home    Distant Location (provider location):  Off-site  Telephone visit completed due to the patient did not have access to video, while the distant provider did.    Assessment & Plan     Lipid screening    - Lipid panel reflex to direct LDL Fasting; Future    Vasovagal syncope    Likely reoccurence  of vasovagal syncope.  She was diagnosed with this by neurology in 2012.  But as she had chest pain and shaking with episodes she needs addiional work up.  Chest pain less likely to be cardiac as she was at rest during the episode.     - CBC with platelets; Future  - TSH with free T4 reflex; Future  - Basic metabolic panel  (Ca, Cl, CO2, Creat, Gluc, K, Na, BUN); Future          BMI  Estimated body mass index is 31.8 kg/m  as calculated from the following:    Height as of 4/26/24: 1.676 m (5' 6\").    Weight as of 4/26/24: 89.4 kg (197 lb).   Weight management plan: Discussed healthy diet and exercise guidelines      Follow-up   appointment in person next week.  Will get labs prior and go to ER if this reoccurs     Subjective   Malou is a 39 year old, presenting for the following health issues:  Syncope        4/22/2025    12:10 PM   Additional Questions   Roomed by Sasha GONZALEZ   Accompanied by self         4/22/2025    12:10 PM   Patient Reported Additional Medications   Patient reports taking the following new medications none     Syncope     History of Present Illness       Reason for visit:  Fainting  Symptom onset:  3-4 weeks ago  Symptoms include:  Pain followed by fainting  Symptom intensity:  Moderate  Symptom progression:  Staying the same  Had these symptoms before:  Yes  Has tried/received treatment for these symptoms:  Yes  Previous treatment was successful:  No  What makes it worse: "  External pain, stress  What makes it better:  Lorazepam   She is taking medications regularly.        Fainting spells in the past around age 5-10 had gone through testing with neurology and everything had checked out normal. She has been good the past 20+ years with occasional dizziness here and there but no fainting spells. 3 weeks ago she was seen by dermatology and had a cyst removed from her back, apparently she passed out while sitting when they were numbing her up with lidocaine and she might have had a seizure (shaking a few seconds).  But she was out for a minute or two- they were able to proceed and complete procedure.      Then Last Friday while out to dinner- started having bilateral rib pain/chest pressure while she was sitting and then fainted shortly after that was out for  30 sec to 1 min. Still feels a little light headed now, but wondering what the next step should be    She had a work up for syncope in 2013.  She was diagnosed with vasovagal syncope.       Patient does have her Annual Physical scheduled for next week 4/29/25    She is eating a shake in the am, large lunch, and small dinner.   She has been execersing a lot without much water.       Her menses are heavy for a day or two then lighter for a few days.        Review of Systems  Constitutional, HEENT, cardiovascular, pulmonary, gi and gu systems are negative, except as otherwise noted.      Objective           Vitals:  No vitals were obtained today due to virtual visit.    Physical Exam   General: Alert and no distress //Respiratory: No audible wheeze, cough, or shortness of breath // Psychiatric:  Appropriate affect, tone, and pace of words            Phone call duration: 15 minutes  Signed Electronically by: Blanquita Schwab DO

## 2025-04-22 NOTE — PROGRESS NOTES
"Malou is a 39 year old who is being evaluated via a billable telephone visit.    How would you like to obtain your AVS? MyChart  If the video visit is dropped, the invitation should be resent by: Text to cell phone: [phone]  Will anyone else be joining your video visit? No  Originating Location (pt. Location): {patient location:988419::\"Home\"}  {PROVIDER LOCATION On-site should be selected for visits conducted from your clinic location or adjoining [facility] hospital, academic office, or other nearby [facility] building. Off-site should be selected for all other provider locations, including home:792007}  Distant Location (provider location):  {virtual location provider:874454}  Telephone visit completed due to {audio only reason:002735}    {PROVIDER CHARTING PREFERENCE:082964}    Subjective   Malou is a 39 year old, presenting for the following health issues:  Syncope        4/22/2025    12:10 PM   Additional Questions   Roomed by Sasha GONZALEZ   Accompanied by self         4/22/2025    12:10 PM   Patient Reported Additional Medications   Patient reports taking the following new medications none     History of Present Illness       Reason for visit:  Fainting  Symptom onset:  3-4 weeks ago  Symptoms include:  Pain followed by fainting  Symptom intensity:  Moderate  Symptom progression:  Staying the same  Had these symptoms before:  Yes  Has tried/received treatment for these symptoms:  Yes  Previous treatment was successful:  No  What makes it worse:  External pain, stress  What makes it better:  Lorazepam   She is taking medications regularly.        Fainting spells in the past around age 5-10 had gone through testing with neurology and everything had checked out normal. She has been good the past 20+ years with occasional dizziness here and there but no fainting spells. 3 weeks ago she was seen by dermatology and had a cyst removed from her back, apparently she passed out when they were numbing her up with lidocaine and she " might have had a seizure, but she was out for a minute or two- they were able to proceed and complete procedure.  Then Last Friday while out to dinner- started having bilateral rib pain/chest pressure and then fainted shortly after that was out for  30 sec to 1 min. Still feels a little light headed now, but wondering what the next step should be    Patient does have her Annual Physical scheduled for next week 4/29/25        {additonal problems for provider to add (Optional):587129}    {ROS Picklists (Optional):543101}      Objective           Vitals:  No vitals were obtained today due to virtual visit.    Physical Exam   General: Alert and no distress //Respiratory: No audible wheeze, cough, or shortness of breath // Psychiatric:  Appropriate affect, tone, and pace of words      {Diagnostic Test Results (Optional):292594}      Phone call duration: *** minutes  Signed Electronically by: Blanquita Schwab DO  {Email feedback regarding this note to [email or web address]   :[id number]}

## 2025-04-24 SDOH — HEALTH STABILITY: PHYSICAL HEALTH: ON AVERAGE, HOW MANY MINUTES DO YOU ENGAGE IN EXERCISE AT THIS LEVEL?: 30 MIN

## 2025-04-24 SDOH — HEALTH STABILITY: PHYSICAL HEALTH: ON AVERAGE, HOW MANY DAYS PER WEEK DO YOU ENGAGE IN MODERATE TO STRENUOUS EXERCISE (LIKE A BRISK WALK)?: 3 DAYS

## 2025-04-24 ASSESSMENT — SOCIAL DETERMINANTS OF HEALTH (SDOH): HOW OFTEN DO YOU GET TOGETHER WITH FRIENDS OR RELATIVES?: ONCE A WEEK

## 2025-04-28 ASSESSMENT — PATIENT HEALTH QUESTIONNAIRE - PHQ9
SUM OF ALL RESPONSES TO PHQ QUESTIONS 1-9: 7
10. IF YOU CHECKED OFF ANY PROBLEMS, HOW DIFFICULT HAVE THESE PROBLEMS MADE IT FOR YOU TO DO YOUR WORK, TAKE CARE OF THINGS AT HOME, OR GET ALONG WITH OTHER PEOPLE: SOMEWHAT DIFFICULT
SUM OF ALL RESPONSES TO PHQ QUESTIONS 1-9: 7

## 2025-04-29 ENCOUNTER — OFFICE VISIT (OUTPATIENT)
Dept: FAMILY MEDICINE | Facility: CLINIC | Age: 40
End: 2025-04-29
Attending: FAMILY MEDICINE
Payer: COMMERCIAL

## 2025-04-29 VITALS
HEIGHT: 67 IN | DIASTOLIC BLOOD PRESSURE: 64 MMHG | HEART RATE: 62 BPM | OXYGEN SATURATION: 100 % | BODY MASS INDEX: 24.64 KG/M2 | TEMPERATURE: 98.5 F | WEIGHT: 157 LBS | RESPIRATION RATE: 16 BRPM | SYSTOLIC BLOOD PRESSURE: 102 MMHG

## 2025-04-29 DIAGNOSIS — R55 VASOVAGAL SYNCOPE: ICD-10-CM

## 2025-04-29 DIAGNOSIS — Z13.220 LIPID SCREENING: ICD-10-CM

## 2025-04-29 DIAGNOSIS — F33.1 MODERATE EPISODE OF RECURRENT MAJOR DEPRESSIVE DISORDER (H): ICD-10-CM

## 2025-04-29 DIAGNOSIS — R73.03 PREDIABETES: ICD-10-CM

## 2025-04-29 DIAGNOSIS — Z00.00 ROUTINE GENERAL MEDICAL EXAMINATION AT A HEALTH CARE FACILITY: Primary | ICD-10-CM

## 2025-04-29 DIAGNOSIS — F41.9 ANXIETY: ICD-10-CM

## 2025-04-29 LAB
ANION GAP SERPL CALCULATED.3IONS-SCNC: 13 MMOL/L (ref 7–15)
BUN SERPL-MCNC: 10.4 MG/DL (ref 6–20)
CALCIUM SERPL-MCNC: 9.4 MG/DL (ref 8.8–10.4)
CHLORIDE SERPL-SCNC: 108 MMOL/L (ref 98–107)
CHOLEST SERPL-MCNC: 124 MG/DL
CREAT SERPL-MCNC: 0.74 MG/DL (ref 0.51–0.95)
EGFRCR SERPLBLD CKD-EPI 2021: >90 ML/MIN/1.73M2
ERYTHROCYTE [DISTWIDTH] IN BLOOD BY AUTOMATED COUNT: 13.2 % (ref 10–15)
FASTING STATUS PATIENT QL REPORTED: YES
FASTING STATUS PATIENT QL REPORTED: YES
GLUCOSE SERPL-MCNC: 93 MG/DL (ref 70–99)
HCO3 SERPL-SCNC: 21 MMOL/L (ref 22–29)
HCT VFR BLD AUTO: 40.5 % (ref 35–47)
HDLC SERPL-MCNC: 69 MG/DL
HGB BLD-MCNC: 13.3 G/DL (ref 11.7–15.7)
LDLC SERPL CALC-MCNC: 48 MG/DL
MCH RBC QN AUTO: 30 PG (ref 26.5–33)
MCHC RBC AUTO-ENTMCNC: 32.8 G/DL (ref 31.5–36.5)
MCV RBC AUTO: 91 FL (ref 78–100)
NONHDLC SERPL-MCNC: 55 MG/DL
PLATELET # BLD AUTO: 272 10E3/UL (ref 150–450)
POTASSIUM SERPL-SCNC: 4.2 MMOL/L (ref 3.4–5.3)
RBC # BLD AUTO: 4.44 10E6/UL (ref 3.8–5.2)
SODIUM SERPL-SCNC: 142 MMOL/L (ref 135–145)
TRIGL SERPL-MCNC: 34 MG/DL
TSH SERPL DL<=0.005 MIU/L-ACNC: 2.18 UIU/ML (ref 0.3–4.2)
WBC # BLD AUTO: 7.3 10E3/UL (ref 4–11)

## 2025-04-29 PROCEDURE — 91320 SARSCV2 VAC 30MCG TRS-SUC IM: CPT | Performed by: FAMILY MEDICINE

## 2025-04-29 PROCEDURE — 84443 ASSAY THYROID STIM HORMONE: CPT | Performed by: FAMILY MEDICINE

## 2025-04-29 PROCEDURE — 96127 BRIEF EMOTIONAL/BEHAV ASSMT: CPT | Performed by: FAMILY MEDICINE

## 2025-04-29 PROCEDURE — 90480 ADMN SARSCOV2 VAC 1/ONLY CMP: CPT | Performed by: FAMILY MEDICINE

## 2025-04-29 PROCEDURE — 90471 IMMUNIZATION ADMIN: CPT | Performed by: FAMILY MEDICINE

## 2025-04-29 PROCEDURE — 85027 COMPLETE CBC AUTOMATED: CPT | Performed by: FAMILY MEDICINE

## 2025-04-29 PROCEDURE — 90651 9VHPV VACCINE 2/3 DOSE IM: CPT | Performed by: FAMILY MEDICINE

## 2025-04-29 PROCEDURE — 1126F AMNT PAIN NOTED NONE PRSNT: CPT | Performed by: FAMILY MEDICINE

## 2025-04-29 PROCEDURE — 99395 PREV VISIT EST AGE 18-39: CPT | Mod: 25 | Performed by: FAMILY MEDICINE

## 2025-04-29 PROCEDURE — 36415 COLL VENOUS BLD VENIPUNCTURE: CPT | Performed by: FAMILY MEDICINE

## 2025-04-29 PROCEDURE — 3074F SYST BP LT 130 MM HG: CPT | Performed by: FAMILY MEDICINE

## 2025-04-29 PROCEDURE — 99214 OFFICE O/P EST MOD 30 MIN: CPT | Mod: 25 | Performed by: FAMILY MEDICINE

## 2025-04-29 PROCEDURE — G2211 COMPLEX E/M VISIT ADD ON: HCPCS | Performed by: FAMILY MEDICINE

## 2025-04-29 PROCEDURE — 80048 BASIC METABOLIC PNL TOTAL CA: CPT | Performed by: FAMILY MEDICINE

## 2025-04-29 PROCEDURE — 80061 LIPID PANEL: CPT | Performed by: FAMILY MEDICINE

## 2025-04-29 PROCEDURE — 3078F DIAST BP <80 MM HG: CPT | Performed by: FAMILY MEDICINE

## 2025-04-29 PROCEDURE — 93000 ELECTROCARDIOGRAM COMPLETE: CPT | Performed by: FAMILY MEDICINE

## 2025-04-29 RX ORDER — PAROXETINE 40 MG/1
40 TABLET, FILM COATED ORAL EVERY MORNING
Qty: 90 TABLET | Refills: 3 | Status: SHIPPED | OUTPATIENT
Start: 2025-04-29

## 2025-04-29 RX ORDER — BUSPIRONE HYDROCHLORIDE 15 MG/1
7.5-15 TABLET ORAL 2 TIMES DAILY
Qty: 180 TABLET | Refills: 3 | Status: SHIPPED | OUTPATIENT
Start: 2025-04-29

## 2025-04-29 ASSESSMENT — PAIN SCALES - GENERAL: PAINLEVEL_OUTOF10: NO PAIN (0)

## 2025-04-29 NOTE — PROGRESS NOTES
Preventive Care Visit  [facility]  Blanquita DO Josselin, Family Medicine  Apr 29, 2025      Assessment & Plan     Routine general medical examination at a health care facility      Vasovagal syncope    The patient's had 2 episodes of what sounds like vasovagal syncope in the last 2 months.  Of note she has lost 100 pounds in the last 5 years on Ozempic.  She has lost 50 pounds in the last year.  She had been diagnosed with vasovagal syncope through neurology in 2012.  Will do routine labs for syncope today include checking a CBC and TSH.  Will refer to neurology because of possible postictal after the syncope    - Adult Neurology  Referral; Future  - EKG 12-lead complete w/read - Clinics  - OFFICE/OUTPT VISIT,ESTLEVL IV    Prediabetes  The patient was instructed to reduce her semaglutide dose and to think about coming off of it soon.  She does not need to lose more weight.  - Semaglutide, 1 MG/DOSE, (OZEMPIC) 4 MG/3ML pen; Inject 0.5 mg subcutaneously every 7 days.  - OFFICE/OUTPT VISIT,ESTLEVL IV    Moderate episode of recurrent major depressive disorder (H)  The current medical regimen is effective;  continue present plan and medications.    - PARoxetine (PAXIL) 40 MG tablet; Take 1 tablet (40 mg) by mouth every morning.  - OFFICE/OUTPT VISIT,ESTLEVL IV    Anxiety  The patient's anxiety is worsened since stopping BuSpar.  I recommended she restart the BuSpar and I have given her refills.  - busPIRone (BUSPAR) 15 MG tablet; Take 0.5-1 tablets (7.5-15 mg) by mouth 2 times daily.  - OFFICE/OUTPT VISIT,EST,LEVL IV            Counseling  Appropriate preventive services were addressed with this patient via screening, questionnaire, or discussion as appropriate for fall prevention, nutrition, physical activity, Tobacco-use cessation, social engagement, weight loss and cognition.  Checklist reviewing preventive services available has been given to the patient.  Reviewed patient's diet,  addressing concerns and/or questions.   She is at risk for lack of exercise and has been provided with information to increase physical activity for the benefit of her well-being.   She is at risk for psychosocial distress and has been provided with information to reduce risk.   The patient's PHQ-9 score is consistent with mild depression. She was provided with information regarding depression.       Follow-up  Return in about 1 year (around 4/29/2026) for Physical Exam.    Joann Westfall is a 39 year old, presenting for the following:  Physical        4/29/2025     6:50 AM   Additional Questions   Roomed by Sasha GONZALEZ   Accompanied by wife         4/29/2025     6:50 AM   Patient Reported Additional Medications   Patient reports taking the following new medications none          HPI       Depression and Anxiety   How are you doing with your depression since your last visit? No change  How are you doing with your anxiety since your last visit?  No change  Are you having other symptoms that might be associated with depression or anxiety? No  Have you had a significant life event? No   Do you have any concerns with your use of alcohol or other drugs? No  Paxil 40 mg daily    She is to be on BuSpar 15 mg twice daily but stopped this.  She also had a small prescription for hydroxyzine for breakthrough anxiety.  The buspar     The patient had been on Ozempic last year but her insurance stopped paying for it.  She has lost 100 pounds from 5 years ago!    She is here to follow up on her vasovagal syncope.  It was diagnosed through neurology in 2012.  She had some chest pain with the syncope the last time.  The pain was epigastric.   This was when she was in the restaurant.       The first time she seemed tensed up but not shaking.   She felt funny after.  Her wife had to drive her home.   She felt out of it for 10-15 mins.   She didn't have an EEG 2012.      Her menses is heavy for two days.      Wt Readings from Last 4  Encounters:   04/29/25 71.2 kg (157 lb)   04/26/24 89.4 kg (197 lb)   01/19/24 96.4 kg (212 lb 9.6 oz)   01/05/24 98 kg (216 lb)        Social History     Tobacco Use    Smoking status: Never     Passive exposure: Never    Smokeless tobacco: Never   Vaping Use    Vaping status: Never Used   Substance Use Topics    Alcohol use: Yes    Drug use: Not Currently     Types: Cocaine, Marijuana, Amyl nitrate, LSD, MDMA (Ecstasy), Psilocybin     Comment: Havent done any of these in many years. [facility]         4/26/2024     6:52 AM 4/22/2025    11:22 AM 4/28/2025    10:38 AM   PHQ   PHQ-9 Total Score 7 9  7    Q9: Thoughts of better off dead/self-harm past 2 weeks Not at all Not at all Not at all       Patient-reported         4/19/2024    10:20 AM 4/26/2024     6:52 AM 4/22/2025    11:22 AM   ADELINA-7 SCORE   Total Score 7 (mild anxiety) 6 (mild anxiety) 13 (moderate anxiety)   Total Score 7 6 13        Patient-reported         4/28/2025    10:38 AM   Last PHQ-9   1.  Little interest or pleasure in doing things 1   2.  Feeling down, depressed, or hopeless 1   3.  Trouble falling or staying asleep, or sleeping too much 1   4.  Feeling tired or having little energy 1   5.  Poor appetite or overeating 0   6.  Feeling bad about yourself 1   7.  Trouble concentrating 1   8.  Moving slowly or restless 1   Q9: Thoughts of better off dead/self-harm past 2 weeks 0   PHQ-9 Total Score 7        Patient-reported         4/22/2025    11:22 AM   ADELINA-7    1. Feeling nervous, anxious, or on edge 3   2. Not being able to stop or control worrying 3   3. Worrying too much about different things 2   4. Trouble relaxing 1   5. Being so restless that it is hard to sit still 1   6. Becoming easily annoyed or irritable 1   7. Feeling afraid, as if something awful might happen 2   ADELINA-7 Total Score 13    If you checked any problems, how difficult have they made it for you to do your work, take care of things at home, or get along with  other people? Very difficult       Patient-reported       Suicide Assessment Five-step Evaluation and Treatment (SAFE-T)        Advance Care Planning    Discussed advance care planning with patient; informed AVS has link to [facility].        4/24/2025   General Health   How would you rate your overall physical health? (!) FAIR   Feel stress (tense, anxious, or unable to sleep) Rather much   (!) STRESS CONCERN      4/24/2025   Nutrition   Three or more servings of calcium each day? Yes   Diet: Regular (no restrictions)   How many servings of fruit and vegetables per day? (!) 2-3   How many sweetened beverages each day? 0-1         4/24/2025   Exercise   Days per week of moderate/strenous exercise 3 days   Average minutes spent exercising at this level 30 min         4/24/2025   Social Factors   Frequency of gathering with friends or relatives Once a week   Worry food won't last until get money to buy more No   Food not last or not have enough money for food? No   Do you have housing? (Housing is defined as stable permanent housing and does not include staying outside in a car, in a tent, in an abandoned building, in an overnight shelter, or couch-surfing.) Yes   Are you worried about losing your housing? No   Lack of transportation? No   Unable to get utilities (heat,electricity)? No         4/24/2025   Dental   Dentist two times every year? Yes       Today's PHQ-9 Score:       4/28/2025    10:38 AM   PHQ-9 SCORE   PHQ-9 Total Score MyChart 7 (Mild depression)   PHQ-9 Total Score 7        Patient-reported         4/24/2025   Substance Use   Alcohol more than 3/day or more than 7/wk No   Do you use any other substances recreationally? No     Social History     Tobacco Use    Smoking status: Never     Passive exposure: Never    Smokeless tobacco: Never   Vaping Use    Vaping status: Never Used   Substance Use Topics    Alcohol use: Yes    Drug use: Not Currently     Types: Cocaine, Marijuana, Amyl nitrate, LSD,  "MDMA (Ecstasy), Psilocybin     Comment: Havent done any of these in many years. [facility]           4/14/2023   LAST FHS-7 RESULTS   1st degree relative breast or ovarian cancer No    Any relative bilateral breast cancer No    Any male have breast cancer No    Any ONE woman have BOTH breast AND ovarian cancer No    Any woman with breast cancer before 50yrs No    2 or more relatives with breast AND/OR ovarian cancer No    2 or more relatives with breast AND/OR bowel cancer No        Proxy-reported        Mammogram Screening - Patient under 40 years of age: Routine Mammogram Screening not recommended.         4/24/2025   STI Screening   New sexual partner(s) since last STI/HIV test? No     History of abnormal Pap smear: No - age 30- 64 PAP with HPV every 5 years recommended        7/12/2021     8:28 AM   PAP / HPV   PAP-ABSTRACT See Scanned Document           This result is from an external source.           4/24/2025   Contraception/Family Planning   Questions about contraception or family planning No        Reviewed and updated as needed this visit by Provider                          Review of Systems  Constitutional, HEENT, cardiovascular, pulmonary, gi and gu systems are negative, except as otherwise noted.     Objective    Exam  /64   Pulse 62   Temp 98.5  F (36.9  C) (Oral)   Resp 16   Ht 1.689 m (5' 6.5\")   Wt 71.2 kg (157 lb)   LMP 04/01/2025   SpO2 100%   BMI 24.96 kg/m     Estimated body mass index is 24.96 kg/m  as calculated from the following:    Height as of this encounter: 1.689 m (5' 6.5\").    Weight as of this encounter: 71.2 kg (157 lb).    Physical Exam  GENERAL: alert and no distress  EYES: Eyes grossly normal to inspection, PERRL and conjunctivae and sclerae normal  HENT: ear canals and TM's normal, nose and mouth without ulcers or lesions  NECK: no adenopathy, no asymmetry, masses, or scars  RESP: lungs clear to auscultation - no rales, rhonchi or wheezes  BREAST: normal " without masses, tenderness or nipple discharge and no palpable axillary masses or adenopathy  CV: regular rate and rhythm, normal S1 S2, no S3 or S4, no murmur, click or rub, no peripheral edema  ABDOMEN: soft, nontender, no hepatosplenomegaly, no masses and bowel sounds normal  MS: no gross musculoskeletal defects noted, no edema  SKIN: no suspicious lesions or rashes  NEURO: Normal strength and tone, mentation intact and speech normal  PSYCH: mentation appears normal, affect normal/bright        Signed Electronically by: Blanquita Schwab, DO

## 2025-04-30 ENCOUNTER — PATIENT OUTREACH (OUTPATIENT)
Dept: CARE COORDINATION | Facility: CLINIC | Age: 40
End: 2025-04-30
Payer: COMMERCIAL